# Patient Record
Sex: FEMALE | Race: WHITE | NOT HISPANIC OR LATINO | Employment: FULL TIME | URBAN - METROPOLITAN AREA
[De-identification: names, ages, dates, MRNs, and addresses within clinical notes are randomized per-mention and may not be internally consistent; named-entity substitution may affect disease eponyms.]

---

## 2017-01-04 ENCOUNTER — HOSPITAL ENCOUNTER (OUTPATIENT)
Facility: MEDICAL CENTER | Age: 37
End: 2017-01-04
Attending: FAMILY MEDICINE
Payer: COMMERCIAL

## 2017-01-04 PROCEDURE — 87653 STREP B DNA AMP PROBE: CPT

## 2017-01-05 LAB — GP B STREP DNA SPEC QL NAA+PROBE: NEGATIVE

## 2017-07-26 ENCOUNTER — HOSPITAL ENCOUNTER (OUTPATIENT)
Facility: MEDICAL CENTER | Age: 37
End: 2017-07-26
Attending: OBSTETRICS & GYNECOLOGY
Payer: COMMERCIAL

## 2017-07-26 ENCOUNTER — HOSPITAL ENCOUNTER (OUTPATIENT)
Dept: LAB | Facility: MEDICAL CENTER | Age: 37
End: 2017-07-26
Attending: OBSTETRICS & GYNECOLOGY
Payer: COMMERCIAL

## 2017-07-26 PROCEDURE — 88175 CYTOPATH C/V AUTO FLUID REDO: CPT

## 2017-07-27 LAB — CYTOLOGY REG CYTOL: NORMAL

## 2018-02-21 ENCOUNTER — OFFICE VISIT (OUTPATIENT)
Dept: URGENT CARE | Facility: PHYSICIAN GROUP | Age: 38
End: 2018-02-21
Payer: COMMERCIAL

## 2018-02-21 VITALS
HEART RATE: 94 BPM | HEIGHT: 65 IN | RESPIRATION RATE: 14 BRPM | WEIGHT: 220 LBS | DIASTOLIC BLOOD PRESSURE: 77 MMHG | SYSTOLIC BLOOD PRESSURE: 120 MMHG | TEMPERATURE: 97.8 F | BODY MASS INDEX: 36.65 KG/M2 | OXYGEN SATURATION: 96 %

## 2018-02-21 DIAGNOSIS — J01.00 ACUTE NON-RECURRENT MAXILLARY SINUSITIS: ICD-10-CM

## 2018-02-21 PROCEDURE — 99203 OFFICE O/P NEW LOW 30 MIN: CPT | Performed by: PHYSICIAN ASSISTANT

## 2018-02-21 RX ORDER — AMOXICILLIN AND CLAVULANATE POTASSIUM 875; 125 MG/1; MG/1
1 TABLET, FILM COATED ORAL 2 TIMES DAILY
Qty: 14 TAB | Refills: 0 | Status: SHIPPED | OUTPATIENT
Start: 2018-02-21 | End: 2018-02-28

## 2018-02-21 ASSESSMENT — ENCOUNTER SYMPTOMS
MUSCULOSKELETAL NEGATIVE: 1
SORE THROAT: 0
DIZZINESS: 0
NAUSEA: 0
DIARRHEA: 0
SINUS PAIN: 1
COUGH: 1
SHORTNESS OF BREATH: 0
ABDOMINAL PAIN: 0
SWOLLEN GLANDS: 0
CHILLS: 0
VOMITING: 0
FEVER: 0
SINUS PRESSURE: 1
SPUTUM PRODUCTION: 0
HEADACHES: 0

## 2018-02-21 NOTE — PROGRESS NOTES
"Subjective:      Adrianne Yanez is a 37 y.o. female who presents with Sinus Problem (sinus preasure x2 weeks)            Sinus Problem   This is a new problem. The current episode started 1 to 4 weeks ago (2 weeks). The problem is unchanged. There has been no fever. Her pain is at a severity of 2/10. The pain is mild. Associated symptoms include congestion, coughing, sinus pressure and sneezing. Pertinent negatives include no chills, ear pain, headaches, shortness of breath, sore throat or swollen glands. Past treatments include nothing.       Review of Systems   Constitutional: Negative for chills and fever.   HENT: Positive for congestion, sinus pain, sinus pressure and sneezing. Negative for ear pain and sore throat.    Respiratory: Positive for cough. Negative for sputum production and shortness of breath.    Cardiovascular: Negative for chest pain.   Gastrointestinal: Negative for abdominal pain, diarrhea, nausea and vomiting.   Genitourinary: Negative.    Musculoskeletal: Negative.    Skin: Negative for rash.   Neurological: Negative for dizziness and headaches.          Objective:     /77   Pulse 94   Temp 36.6 °C (97.8 °F)   Resp 14   Ht 1.651 m (5' 5\")   Wt 99.8 kg (220 lb)   SpO2 96%   BMI 36.61 kg/m²      Physical Exam   Constitutional: She is oriented to person, place, and time. She appears well-developed and well-nourished. No distress.   HENT:   Head: Normocephalic and atraumatic.       Right Ear: Hearing, tympanic membrane, external ear and ear canal normal.   Left Ear: Hearing, tympanic membrane, external ear and ear canal normal.   Nose: Right sinus exhibits maxillary sinus tenderness. Right sinus exhibits no frontal sinus tenderness. Left sinus exhibits maxillary sinus tenderness. Left sinus exhibits no frontal sinus tenderness.   Mouth/Throat: Oropharynx is clear and moist. No oropharyngeal exudate, posterior oropharyngeal edema or posterior oropharyngeal erythema.   Eyes: " Conjunctivae are normal. Pupils are equal, round, and reactive to light. Right eye exhibits no discharge. Left eye exhibits no discharge.   Neck: Normal range of motion.   Cardiovascular: Normal rate, regular rhythm and normal heart sounds.    No murmur heard.  Pulmonary/Chest: Effort normal and breath sounds normal. No respiratory distress. She has no wheezes. She has no rales.   Musculoskeletal: Normal range of motion.   Lymphadenopathy:     She has no cervical adenopathy.   Neurological: She is alert and oriented to person, place, and time.   Skin: Skin is warm and dry. She is not diaphoretic.   Psychiatric: She has a normal mood and affect. Her behavior is normal.   Nursing note and vitals reviewed.              PMH:  has no past medical history on file.  MEDS:   Current Outpatient Prescriptions:   •  amoxicillin-clavulanate (AUGMENTIN) 875-125 MG Tab, Take 1 Tab by mouth 2 times a day for 7 days., Disp: 14 Tab, Rfl: 0  •  oxycodone-acetaminophen (PERCOCET) 5-325 MG Tab, Take 1 Tab by mouth every four hours as needed for Moderate Pain ((Pain Scale 4-6))., Disp: 20 Tab, Rfl: 0  •  ibuprofen (MOTRIN) 600 MG Tab, Take 1 Tab by mouth every 6 hours as needed (Cramping)., Disp: 30 Tab, Rfl: 3  •  sennosides-docusate sodium (SENOKOT-S) 8.6-50 MG tablet, Take 1 Tab by mouth 2 times a day., Disp: 60 Tab, Rfl: 1  •  Cholecalciferol (VITAMIN D3) 5000 UNITS Cap, Take 1 Cap by mouth every day., Disp: , Rfl:   •  prenatal plus vitamin (STUARTNATAL 1+1) 27-1 MG TABS tablet, Take 1 Tab by mouth every morning., Disp: 30 Tab, Rfl: 3  ALLERGIES: No Known Allergies  SURGHX:   Past Surgical History:   Procedure Laterality Date   • REPEAT C SECTION  2/4/2017    Procedure: REPEAT C SECTION;  Surgeon: Nadeen Martin M.D.;  Location: [facility] DEL[facility];  Service:    • REPEAT C SECTION  1/7/2015    Performed by Loni Guzman M.D. at LABOR AND DELIVERY   • PRIMARY C SECTION  6/12/2013    Performed by Aryan Westbrook M.D. at LAB[facility] DELIVERY     SOCHX:  reports that she has never smoked. She has never used smokeless tobacco. She reports that she does not drink alcohol or use drugs.  FH: family history is not on file.    Assessment/Plan:     1. Acute non-recurrent maxillary sinusitis  - amoxicillin-clavulanate (AUGMENTIN) 875-125 MG Tab; Take 1 Tab by mouth 2 times a day for 7 days.  Dispense: 14 Tab; Refill: 0   - Complete full course of antibiotics as prescribed     Discussed use of nedi-pot, humidifier, and Flonase nasal spray for symptomatic relief. Call or return to office if symptoms persist or worsen. The patient demonstrated a good understanding and agreed with the treatment plan.

## 2018-07-27 ENCOUNTER — HOSPITAL ENCOUNTER (OUTPATIENT)
Dept: LAB | Facility: MEDICAL CENTER | Age: 38
End: 2018-07-27
Attending: OBSTETRICS & GYNECOLOGY
Payer: COMMERCIAL

## 2018-07-27 PROCEDURE — 87624 HPV HI-RISK TYP POOLED RSLT: CPT

## 2018-07-27 PROCEDURE — 88175 CYTOPATH C/V AUTO FLUID REDO: CPT

## 2018-07-31 LAB
CYTOLOGY REG CYTOL: NORMAL
HPV HR 12 DNA CVX QL NAA+PROBE: NEGATIVE
HPV16 DNA SPEC QL NAA+PROBE: NEGATIVE
HPV18 DNA SPEC QL NAA+PROBE: NEGATIVE
SPECIMEN SOURCE: NORMAL

## 2018-10-04 ENCOUNTER — HOSPITAL ENCOUNTER (OUTPATIENT)
Dept: LAB | Facility: MEDICAL CENTER | Age: 38
End: 2018-10-04
Attending: OBSTETRICS & GYNECOLOGY
Payer: COMMERCIAL

## 2018-10-04 PROCEDURE — 87491 CHLMYD TRACH DNA AMP PROBE: CPT

## 2018-10-04 PROCEDURE — 87591 N.GONORRHOEAE DNA AMP PROB: CPT

## 2018-10-05 LAB
C TRACH DNA SPEC QL NAA+PROBE: NEGATIVE
N GONORRHOEA DNA SPEC QL NAA+PROBE: NEGATIVE
SPECIMEN SOURCE: NORMAL

## 2018-10-15 ENCOUNTER — HOSPITAL ENCOUNTER (OUTPATIENT)
Dept: LAB | Facility: MEDICAL CENTER | Age: 38
End: 2018-10-15
Attending: OBSTETRICS & GYNECOLOGY
Payer: COMMERCIAL

## 2018-10-15 LAB
ABO GROUP BLD: NORMAL
BASOPHILS # BLD AUTO: 0.5 % (ref 0–1.8)
BASOPHILS # BLD: 0.06 K/UL (ref 0–0.12)
BLD GP AB SCN SERPL QL: NORMAL
EOSINOPHIL # BLD AUTO: 0.39 K/UL (ref 0–0.51)
EOSINOPHIL NFR BLD: 3 % (ref 0–6.9)
ERYTHROCYTE [DISTWIDTH] IN BLOOD BY AUTOMATED COUNT: 42.6 FL (ref 35.9–50)
HCT VFR BLD AUTO: 40.3 % (ref 37–47)
HGB BLD-MCNC: 13 G/DL (ref 12–16)
IMM GRANULOCYTES # BLD AUTO: 0.14 K/UL (ref 0–0.11)
IMM GRANULOCYTES NFR BLD AUTO: 1.1 % (ref 0–0.9)
LYMPHOCYTES # BLD AUTO: 3.2 K/UL (ref 1–4.8)
LYMPHOCYTES NFR BLD: 24.2 % (ref 22–41)
MCH RBC QN AUTO: 26.4 PG (ref 27–33)
MCHC RBC AUTO-ENTMCNC: 32.3 G/DL (ref 33.6–35)
MCV RBC AUTO: 81.9 FL (ref 81.4–97.8)
MONOCYTES # BLD AUTO: 0.61 K/UL (ref 0–0.85)
MONOCYTES NFR BLD AUTO: 4.6 % (ref 0–13.4)
NEUTROPHILS # BLD AUTO: 8.8 K/UL (ref 2–7.15)
NEUTROPHILS NFR BLD: 66.6 % (ref 44–72)
NRBC # BLD AUTO: 0 K/UL
NRBC BLD-RTO: 0 /100 WBC
PLATELET # BLD AUTO: 245 K/UL (ref 164–446)
PMV BLD AUTO: 12.6 FL (ref 9–12.9)
RBC # BLD AUTO: 4.92 M/UL (ref 4.2–5.4)
RH BLD: NORMAL
WBC # BLD AUTO: 13.2 K/UL (ref 4.8–10.8)

## 2018-10-15 PROCEDURE — 86762 RUBELLA ANTIBODY: CPT

## 2018-10-15 PROCEDURE — 85025 COMPLETE CBC W/AUTO DIFF WBC: CPT

## 2018-10-15 PROCEDURE — 86803 HEPATITIS C AB TEST: CPT

## 2018-10-15 PROCEDURE — 87340 HEPATITIS B SURFACE AG IA: CPT

## 2018-10-15 PROCEDURE — 86900 BLOOD TYPING SEROLOGIC ABO: CPT

## 2018-10-15 PROCEDURE — 86901 BLOOD TYPING SEROLOGIC RH(D): CPT

## 2018-10-15 PROCEDURE — 86850 RBC ANTIBODY SCREEN: CPT

## 2018-10-15 PROCEDURE — 86780 TREPONEMA PALLIDUM: CPT

## 2018-10-15 PROCEDURE — 87389 HIV-1 AG W/HIV-1&-2 AB AG IA: CPT

## 2018-10-15 PROCEDURE — 87086 URINE CULTURE/COLONY COUNT: CPT

## 2018-10-15 PROCEDURE — 36415 COLL VENOUS BLD VENIPUNCTURE: CPT

## 2018-10-16 LAB
HBV SURFACE AG SER QL: NEGATIVE
HCV AB SER QL: NEGATIVE
HIV 1+2 AB+HIV1 P24 AG SERPL QL IA: NON REACTIVE
RUBV AB SER QL: 101.9 IU/ML
TREPONEMA PALLIDUM IGG+IGM AB [PRESENCE] IN SERUM OR PLASMA BY IMMUNOASSAY: NON REACTIVE

## 2018-10-18 LAB
BACTERIA UR CULT: NORMAL
SIGNIFICANT IND 70042: NORMAL
SITE SITE: NORMAL
SOURCE SOURCE: NORMAL

## 2018-10-19 ENCOUNTER — HOSPITAL ENCOUNTER (OUTPATIENT)
Dept: LAB | Facility: MEDICAL CENTER | Age: 38
End: 2018-10-19
Attending: OBSTETRICS & GYNECOLOGY
Payer: COMMERCIAL

## 2018-10-19 PROCEDURE — 82106 ALPHA-FETOPROTEIN AMNIOTIC: CPT

## 2018-11-05 LAB
TEST NAME 95000: NORMAL
UNCODED TEST RESULT 95040: NORMAL

## 2018-12-07 ENCOUNTER — HOSPITAL ENCOUNTER (OUTPATIENT)
Dept: LAB | Facility: MEDICAL CENTER | Age: 38
End: 2018-12-07
Attending: OBSTETRICS & GYNECOLOGY
Payer: COMMERCIAL

## 2018-12-11 LAB — AMBIGUOUS DTTM AMBI4: NORMAL

## 2018-12-21 ENCOUNTER — HOSPITAL ENCOUNTER (OUTPATIENT)
Dept: LAB | Facility: MEDICAL CENTER | Age: 38
End: 2018-12-21
Attending: OBSTETRICS & GYNECOLOGY
Payer: COMMERCIAL

## 2018-12-21 LAB
GLUCOSE 1H P 50 G GLC PO SERPL-MCNC: 124 MG/DL (ref 70–139)
HCT VFR BLD AUTO: 38.1 % (ref 37–47)
HGB BLD-MCNC: 12.2 G/DL (ref 12–16)
PLATELET # BLD AUTO: 210 K/UL (ref 164–446)
TREPONEMA PALLIDUM IGG+IGM AB [PRESENCE] IN SERUM OR PLASMA BY IMMUNOASSAY: NON REACTIVE

## 2018-12-21 PROCEDURE — 81265 STR MARKERS SPECIMEN ANAL: CPT

## 2018-12-21 PROCEDURE — 82950 GLUCOSE TEST: CPT

## 2018-12-21 PROCEDURE — 36415 COLL VENOUS BLD VENIPUNCTURE: CPT

## 2018-12-21 PROCEDURE — 85018 HEMOGLOBIN: CPT

## 2018-12-21 PROCEDURE — 85049 AUTOMATED PLATELET COUNT: CPT

## 2018-12-21 PROCEDURE — 369999 HCHG MISC LAB CHARGE

## 2018-12-21 PROCEDURE — 86780 TREPONEMA PALLIDUM: CPT

## 2018-12-21 PROCEDURE — 85014 HEMATOCRIT: CPT

## 2019-01-09 LAB
TEST NAME 95000: NORMAL
UNCODED TEST RESULT 95040: NORMAL

## 2019-02-15 ENCOUNTER — APPOINTMENT (OUTPATIENT)
Dept: RADIOLOGY | Facility: MEDICAL CENTER | Age: 39
DRG: 833 | End: 2019-02-15
Attending: OBSTETRICS & GYNECOLOGY
Payer: COMMERCIAL

## 2019-02-15 ENCOUNTER — HOSPITAL ENCOUNTER (INPATIENT)
Facility: MEDICAL CENTER | Age: 39
LOS: 8 days | DRG: 833 | End: 2019-02-23
Attending: OBSTETRICS & GYNECOLOGY | Admitting: OBSTETRICS & GYNECOLOGY
Payer: COMMERCIAL

## 2019-02-15 LAB
ABO GROUP BLD: NORMAL
ALBUMIN SERPL BCP-MCNC: 3.5 G/DL (ref 3.2–4.9)
ALBUMIN/GLOB SERPL: 1.3 G/DL
ALP SERPL-CCNC: 87 U/L (ref 30–99)
ALT SERPL-CCNC: 14 U/L (ref 2–50)
ANION GAP SERPL CALC-SCNC: 10 MMOL/L (ref 0–11.9)
APPEARANCE UR: ABNORMAL
APPEARANCE UR: CLEAR
APPEARANCE UR: CLEAR
APTT PPP: 29.4 SEC (ref 24.7–36)
APTT PPP: 29.7 SEC (ref 24.7–36)
AST SERPL-CCNC: 18 U/L (ref 12–45)
BASOPHILS # BLD AUTO: 0.3 % (ref 0–1.8)
BASOPHILS # BLD AUTO: 0.4 % (ref 0–1.8)
BASOPHILS # BLD: 0.04 K/UL (ref 0–0.12)
BASOPHILS # BLD: 0.05 K/UL (ref 0–0.12)
BILIRUB SERPL-MCNC: 1.4 MG/DL (ref 0.1–1.5)
BILIRUB UR QL STRIP.AUTO: NEGATIVE
BLD GP AB SCN SERPL QL: NORMAL
BUN SERPL-MCNC: 5 MG/DL (ref 8–22)
CALCIUM SERPL-MCNC: 8.8 MG/DL (ref 8.5–10.5)
CHLORIDE SERPL-SCNC: 107 MMOL/L (ref 96–112)
CO2 SERPL-SCNC: 18 MMOL/L (ref 20–33)
COLOR UR AUTO: ABNORMAL
COLOR UR AUTO: YELLOW
COLOR UR: YELLOW
CREAT SERPL-MCNC: 0.36 MG/DL (ref 0.5–1.4)
EOSINOPHIL # BLD AUTO: 0.3 K/UL (ref 0–0.51)
EOSINOPHIL # BLD AUTO: 0.31 K/UL (ref 0–0.51)
EOSINOPHIL NFR BLD: 2.2 % (ref 0–6.9)
EOSINOPHIL NFR BLD: 2.4 % (ref 0–6.9)
ERYTHROCYTE [DISTWIDTH] IN BLOOD BY AUTOMATED COUNT: 45.2 FL (ref 35.9–50)
ERYTHROCYTE [DISTWIDTH] IN BLOOD BY AUTOMATED COUNT: 45.7 FL (ref 35.9–50)
FIBRINOGEN PPP-MCNC: 579 MG/DL (ref 215–460)
FIBRINOGEN PPP-MCNC: 634 MG/DL (ref 215–460)
GLOBULIN SER CALC-MCNC: 2.8 G/DL (ref 1.9–3.5)
GLUCOSE SERPL-MCNC: 100 MG/DL (ref 65–99)
GLUCOSE UR QL STRIP.AUTO: NEGATIVE MG/DL
GLUCOSE UR QL STRIP.AUTO: NEGATIVE MG/DL
GLUCOSE UR STRIP.AUTO-MCNC: NEGATIVE MG/DL
HCT VFR BLD AUTO: 37.6 % (ref 37–47)
HCT VFR BLD AUTO: 40.2 % (ref 37–47)
HGB BLD-MCNC: 12.1 G/DL (ref 12–16)
HGB BLD-MCNC: 12.8 G/DL (ref 12–16)
IMM GRANULOCYTES # BLD AUTO: 0.14 K/UL (ref 0–0.11)
IMM GRANULOCYTES # BLD AUTO: 0.15 K/UL (ref 0–0.11)
IMM GRANULOCYTES NFR BLD AUTO: 1.1 % (ref 0–0.9)
IMM GRANULOCYTES NFR BLD AUTO: 1.1 % (ref 0–0.9)
INR PPP: 1.23 (ref 0.87–1.13)
INR PPP: 1.38 (ref 0.87–1.13)
KETONES UR QL STRIP.AUTO: 40 MG/DL
KETONES UR QL STRIP.AUTO: NEGATIVE MG/DL
KETONES UR STRIP.AUTO-MCNC: 15 MG/DL
LEUKOCYTE ESTERASE UR QL STRIP.AUTO: NEGATIVE
LYMPHOCYTES # BLD AUTO: 2.39 K/UL (ref 1–4.8)
LYMPHOCYTES # BLD AUTO: 2.44 K/UL (ref 1–4.8)
LYMPHOCYTES NFR BLD: 17.8 % (ref 22–41)
LYMPHOCYTES NFR BLD: 19 % (ref 22–41)
MCH RBC QN AUTO: 27.1 PG (ref 27–33)
MCH RBC QN AUTO: 27.3 PG (ref 27–33)
MCHC RBC AUTO-ENTMCNC: 31.8 G/DL (ref 33.6–35)
MCHC RBC AUTO-ENTMCNC: 32.2 G/DL (ref 33.6–35)
MCV RBC AUTO: 84.7 FL (ref 81.4–97.8)
MCV RBC AUTO: 85 FL (ref 81.4–97.8)
MICRO URNS: ABNORMAL
MONOCYTES # BLD AUTO: 0.51 K/UL (ref 0–0.85)
MONOCYTES # BLD AUTO: 0.66 K/UL (ref 0–0.85)
MONOCYTES NFR BLD AUTO: 3.8 % (ref 0–13.4)
MONOCYTES NFR BLD AUTO: 5.1 % (ref 0–13.4)
NEUTROPHILS # BLD AUTO: 10.01 K/UL (ref 2–7.15)
NEUTROPHILS # BLD AUTO: 9.26 K/UL (ref 2–7.15)
NEUTROPHILS NFR BLD: 72 % (ref 44–72)
NEUTROPHILS NFR BLD: 74.8 % (ref 44–72)
NITRITE UR QL STRIP.AUTO: NEGATIVE
NRBC # BLD AUTO: 0 K/UL
NRBC # BLD AUTO: 0 K/UL
NRBC BLD-RTO: 0 /100 WBC
NRBC BLD-RTO: 0 /100 WBC
PH UR STRIP.AUTO: 5 [PH]
PH UR STRIP.AUTO: 6 [PH]
PH UR STRIP.AUTO: 6.5 [PH]
PLATELET # BLD AUTO: 193 K/UL (ref 164–446)
PLATELET # BLD AUTO: 211 K/UL (ref 164–446)
PMV BLD AUTO: 11.7 FL (ref 9–12.9)
PMV BLD AUTO: 12 FL (ref 9–12.9)
POTASSIUM SERPL-SCNC: 3.7 MMOL/L (ref 3.6–5.5)
PROT SERPL-MCNC: 6.3 G/DL (ref 6–8.2)
PROT UR QL STRIP: 100 MG/DL
PROT UR QL STRIP: NEGATIVE MG/DL
PROT UR QL STRIP: NEGATIVE MG/DL
PROTHROMBIN TIME: 15.6 SEC (ref 12–14.6)
PROTHROMBIN TIME: 17.1 SEC (ref 12–14.6)
RBC # BLD AUTO: 4.44 M/UL (ref 4.2–5.4)
RBC # BLD AUTO: 4.73 M/UL (ref 4.2–5.4)
RBC UR QL AUTO: ABNORMAL
RBC UR QL AUTO: NEGATIVE
RBC UR QL AUTO: NEGATIVE
RH BLD: NORMAL
SODIUM SERPL-SCNC: 135 MMOL/L (ref 135–145)
SP GR UR STRIP.AUTO: 1.01
SP GR UR: 1.01
SP GR UR: 1.01
URATE SERPL-MCNC: 5.4 MG/DL (ref 1.9–8.2)
UROBILINOGEN UR STRIP.AUTO-MCNC: 0.2 MG/DL
WBC # BLD AUTO: 12.9 K/UL (ref 4.8–10.8)
WBC # BLD AUTO: 13.4 K/UL (ref 4.8–10.8)

## 2019-02-15 PROCEDURE — 80053 COMPREHEN METABOLIC PANEL: CPT

## 2019-02-15 PROCEDURE — 83735 ASSAY OF MAGNESIUM: CPT

## 2019-02-15 PROCEDURE — 85610 PROTHROMBIN TIME: CPT | Mod: 91

## 2019-02-15 PROCEDURE — 81003 URINALYSIS AUTO W/O SCOPE: CPT

## 2019-02-15 PROCEDURE — 36415 COLL VENOUS BLD VENIPUNCTURE: CPT

## 2019-02-15 PROCEDURE — 76815 OB US LIMITED FETUS(S): CPT

## 2019-02-15 PROCEDURE — 86901 BLOOD TYPING SEROLOGIC RH(D): CPT

## 2019-02-15 PROCEDURE — 86900 BLOOD TYPING SEROLOGIC ABO: CPT

## 2019-02-15 PROCEDURE — 81002 URINALYSIS NONAUTO W/O SCOPE: CPT | Mod: 91

## 2019-02-15 PROCEDURE — 84550 ASSAY OF BLOOD/URIC ACID: CPT

## 2019-02-15 PROCEDURE — 96372 THER/PROPH/DIAG INJ SC/IM: CPT

## 2019-02-15 PROCEDURE — 86850 RBC ANTIBODY SCREEN: CPT

## 2019-02-15 PROCEDURE — 59025 FETAL NON-STRESS TEST: CPT

## 2019-02-15 PROCEDURE — 85025 COMPLETE CBC W/AUTO DIFF WBC: CPT | Mod: 91

## 2019-02-15 PROCEDURE — 87150 DNA/RNA AMPLIFIED PROBE: CPT

## 2019-02-15 PROCEDURE — 700111 HCHG RX REV CODE 636 W/ 250 OVERRIDE (IP): Performed by: OBSTETRICS & GYNECOLOGY

## 2019-02-15 PROCEDURE — 770002 HCHG ROOM/CARE - OB PRIVATE (112)

## 2019-02-15 PROCEDURE — 700111 HCHG RX REV CODE 636 W/ 250 OVERRIDE (IP)

## 2019-02-15 PROCEDURE — 700105 HCHG RX REV CODE 258: Performed by: OBSTETRICS & GYNECOLOGY

## 2019-02-15 PROCEDURE — 85730 THROMBOPLASTIN TIME PARTIAL: CPT | Mod: 91

## 2019-02-15 PROCEDURE — 302135 SEQUENTIAL COMPRESSION MACHINE: Performed by: OBSTETRICS & GYNECOLOGY

## 2019-02-15 PROCEDURE — A9270 NON-COVERED ITEM OR SERVICE: HCPCS | Performed by: OBSTETRICS & GYNECOLOGY

## 2019-02-15 PROCEDURE — 85384 FIBRINOGEN ACTIVITY: CPT | Mod: 91

## 2019-02-15 PROCEDURE — 96365 THER/PROPH/DIAG IV INF INIT: CPT

## 2019-02-15 PROCEDURE — 87081 CULTURE SCREEN ONLY: CPT

## 2019-02-15 PROCEDURE — 302790 HCHG STAT ANTEPARTUM CARE, DAILY

## 2019-02-15 PROCEDURE — 700112 HCHG RX REV CODE 229: Performed by: OBSTETRICS & GYNECOLOGY

## 2019-02-15 RX ORDER — VITAMIN A ACETATE, BETA CAROTENE, ASCORBIC ACID, CHOLECALCIFEROL, .ALPHA.-TOCOPHEROL ACETATE, DL-, THIAMINE MONONITRATE, RIBOFLAVIN, NIACINAMIDE, PYRIDOXINE HYDROCHLORIDE, FOLIC ACID, CYANOCOBALAMIN, CALCIUM CARBONATE, FERROUS FUMARATE, ZINC OXIDE, CUPRIC OXIDE 3080; 12; 120; 400; 1; 1.84; 3; 20; 22; 920; 25; 200; 27; 10; 2 [IU]/1; UG/1; MG/1; [IU]/1; MG/1; MG/1; MG/1; MG/1; MG/1; [IU]/1; MG/1; MG/1; MG/1; MG/1; MG/1
1 TABLET, FILM COATED ORAL EVERY MORNING
Status: DISCONTINUED | OUTPATIENT
Start: 2019-02-16 | End: 2019-02-23 | Stop reason: HOSPADM

## 2019-02-15 RX ORDER — CALCIUM GLUCONATE 94 MG/ML
1 INJECTION, SOLUTION INTRAVENOUS ONCE
Status: ACTIVE | OUTPATIENT
Start: 2019-02-15 | End: 2019-02-16

## 2019-02-15 RX ORDER — MAGNESIUM SULFATE HEPTAHYDRATE 40 MG/ML
INJECTION, SOLUTION INTRAVENOUS
Status: COMPLETED
Start: 2019-02-15 | End: 2019-02-15

## 2019-02-15 RX ORDER — ASPIRIN 81 MG/1
81 TABLET, CHEWABLE ORAL DAILY
Status: DISCONTINUED | OUTPATIENT
Start: 2019-02-16 | End: 2019-02-23 | Stop reason: HOSPADM

## 2019-02-15 RX ORDER — CEFAZOLIN SODIUM 2 G/100ML
2 INJECTION, SOLUTION INTRAVENOUS EVERY 8 HOURS
Status: DISCONTINUED | OUTPATIENT
Start: 2019-02-15 | End: 2019-02-18

## 2019-02-15 RX ORDER — MAGNESIUM SULFATE HEPTAHYDRATE 40 MG/ML
3 INJECTION, SOLUTION INTRAVENOUS CONTINUOUS
Status: DISCONTINUED | OUTPATIENT
Start: 2019-02-15 | End: 2019-02-18

## 2019-02-15 RX ORDER — ONDANSETRON 2 MG/ML
INJECTION INTRAMUSCULAR; INTRAVENOUS
Status: COMPLETED
Start: 2019-02-15 | End: 2019-02-15

## 2019-02-15 RX ORDER — DOCUSATE SODIUM 100 MG/1
100 CAPSULE, LIQUID FILLED ORAL 2 TIMES DAILY
Status: DISCONTINUED | OUTPATIENT
Start: 2019-02-15 | End: 2019-02-23 | Stop reason: HOSPADM

## 2019-02-15 RX ORDER — SODIUM CHLORIDE, SODIUM LACTATE, POTASSIUM CHLORIDE, CALCIUM CHLORIDE 600; 310; 30; 20 MG/100ML; MG/100ML; MG/100ML; MG/100ML
INJECTION, SOLUTION INTRAVENOUS CONTINUOUS
Status: DISCONTINUED | OUTPATIENT
Start: 2019-02-15 | End: 2019-02-23 | Stop reason: HOSPADM

## 2019-02-15 RX ORDER — DEXTROSE, SODIUM CHLORIDE, SODIUM LACTATE, POTASSIUM CHLORIDE, AND CALCIUM CHLORIDE 5; .6; .31; .03; .02 G/100ML; G/100ML; G/100ML; G/100ML; G/100ML
INJECTION, SOLUTION INTRAVENOUS CONTINUOUS
Status: DISCONTINUED | OUTPATIENT
Start: 2019-02-15 | End: 2019-02-23 | Stop reason: HOSPADM

## 2019-02-15 RX ORDER — ONDANSETRON 2 MG/ML
4 INJECTION INTRAMUSCULAR; INTRAVENOUS EVERY 4 HOURS PRN
Status: DISCONTINUED | OUTPATIENT
Start: 2019-02-15 | End: 2019-02-23 | Stop reason: HOSPADM

## 2019-02-15 RX ORDER — BETAMETHASONE SODIUM PHOSPHATE AND BETAMETHASONE ACETATE 3; 3 MG/ML; MG/ML
12 INJECTION, SUSPENSION INTRA-ARTICULAR; INTRALESIONAL; INTRAMUSCULAR; SOFT TISSUE EVERY 24 HOURS
Status: COMPLETED | OUTPATIENT
Start: 2019-02-15 | End: 2019-02-16

## 2019-02-15 RX ADMIN — ONDANSETRON 4 MG: 2 INJECTION INTRAMUSCULAR; INTRAVENOUS at 21:26

## 2019-02-15 RX ADMIN — SODIUM CHLORIDE, POTASSIUM CHLORIDE, SODIUM LACTATE AND CALCIUM CHLORIDE: 600; 310; 30; 20 INJECTION, SOLUTION INTRAVENOUS at 21:36

## 2019-02-15 RX ADMIN — SODIUM CHLORIDE, SODIUM LACTATE, POTASSIUM CHLORIDE, CALCIUM CHLORIDE AND DEXTROSE MONOHYDRATE: 5; 600; 310; 30; 20 INJECTION, SOLUTION INTRAVENOUS at 20:22

## 2019-02-15 RX ADMIN — BETAMETHASONE SODIUM PHOSPHATE AND BETAMETHASONE ACETATE 12 MG: 3; 3 INJECTION, SUSPENSION INTRA-ARTICULAR; INTRALESIONAL; INTRAMUSCULAR at 18:28

## 2019-02-15 RX ADMIN — MAGNESIUM SULFATE HEPTAHYDRATE 3 G/HR: 40 INJECTION, SOLUTION INTRAVENOUS at 21:27

## 2019-02-15 RX ADMIN — MAGNESIUM SULFATE HEPTAHYDRATE 2 G: 40 INJECTION, SOLUTION INTRAVENOUS at 21:14

## 2019-02-15 RX ADMIN — MAGNESIUM SULFATE HEPTAHYDRATE 4 G: 40 INJECTION, SOLUTION INTRAVENOUS at 20:58

## 2019-02-15 RX ADMIN — SODIUM CHLORIDE, POTASSIUM CHLORIDE, SODIUM LACTATE AND CALCIUM CHLORIDE: 600; 310; 30; 20 INJECTION, SOLUTION INTRAVENOUS at 12:18

## 2019-02-15 RX ADMIN — DOCUSATE SODIUM 100 MG: 100 CAPSULE, LIQUID FILLED ORAL at 20:21

## 2019-02-15 RX ADMIN — CEFAZOLIN SODIUM 2 G: 2 INJECTION, SOLUTION INTRAVENOUS at 21:37

## 2019-02-15 ASSESSMENT — PATIENT HEALTH QUESTIONNAIRE - PHQ9
1. LITTLE INTEREST OR PLEASURE IN DOING THINGS: NOT AT ALL
2. FEELING DOWN, DEPRESSED, IRRITABLE, OR HOPELESS: NOT AT ALL
2. FEELING DOWN, DEPRESSED, IRRITABLE, OR HOPELESS: NOT AT ALL
SUM OF ALL RESPONSES TO PHQ9 QUESTIONS 1 AND 2: 0
SUM OF ALL RESPONSES TO PHQ9 QUESTIONS 1 AND 2: 0
1. LITTLE INTEREST OR PLEASURE IN DOING THINGS: NOT AT ALL

## 2019-02-15 ASSESSMENT — LIFESTYLE VARIABLES
EVER_SMOKED: NEVER
ALCOHOL_USE: NO

## 2019-02-15 NOTE — PROGRESS NOTES
38 y.o.  EDC 19 EGA 33.4 here to LDA2 with FOB Mynor c/o bright red vaginal bleeding since 0830 this morning. Pt denies recent sexual intercourse. Denies contractions, reports positive fetal movement. Hx 3 previous C/S. Scheduled for a R C/S with BTL on 3/25/19. Pt has a Hx of HELLP syndrome with PTD, currently taking 81 mg ASA. Pt followed by Dr. Gallagher, Baby will need  Echo after delivery. FOB has Marfan Syndrome. 3 other boys have all been seen by Dr. Rojas.Pt lives in [place] near the [facility].  Prenatal records reviewed, no evidence of placental previa.     Clean catch urine specimen obtained. Bright red with tissue fragments floating in urine.   EFM & [place] applied. , moderate variability, accels present, no decels. [place] shows irregular UC's. Contractions palpate mild with soft resting tone. Report to Dr. Sifuentes, on call for Dr. Westbrook. Order for limited US to r/o abruption. CBC, PT, PTT, Fibrinogin, IV fluids. IV started in RAC after multiple attempts. Labs drawn and sent.     US here, baby transverse, LG 14.5 fundal placenta grade 1, umbilical cord noted by cervix.   Results reviewed with Dr. Sifuentes, order for SVE and cath UA.  1200 SVE closed/thick/high.   Cath UA clear yellow, moderate ketones, all other values negative.    Report to Dr. Sifuentes, no new orders. MD will come over after office hours to evaluate pt. If any additional gushes of blood, order Betamethasone.   1640 RN called to pt room, large gush of bright red vaginal bleeding saturating 1/3 to 1/2 of peripad with additional 4 cm [place] of bright red blood on bed alexis. Betamethasone ordered. Message left for Dr. Sifuentes.   1730 Dr. Sifuentes at bedside. Order to Admit pt for observation, repeat coags 6 hours after last draw, give Betamethasone.    1800 Report to Yordan AGUAYO to assume pt care.

## 2019-02-16 LAB
APTT PPP: 27.8 SEC (ref 24.7–36)
APTT PPP: 29.7 SEC (ref 24.7–36)
ERYTHROCYTE [DISTWIDTH] IN BLOOD BY AUTOMATED COUNT: 44.6 FL (ref 35.9–50)
FIBRINOGEN PPP-MCNC: 513 MG/DL (ref 215–460)
FIBRINOGEN PPP-MCNC: 537 MG/DL (ref 215–460)
GP B STREP DNA SPEC QL NAA+PROBE: NEGATIVE
HCT VFR BLD AUTO: 37.2 % (ref 37–47)
HGB BLD-MCNC: 12.2 G/DL (ref 12–16)
INR PPP: 1.3 (ref 0.87–1.13)
INR PPP: 1.34 (ref 0.87–1.13)
MAGNESIUM SERPL-MCNC: 4.9 MG/DL (ref 1.5–2.5)
MAGNESIUM SERPL-MCNC: 5.6 MG/DL (ref 1.5–2.5)
MAGNESIUM SERPL-MCNC: 5.7 MG/DL (ref 1.5–2.5)
MAGNESIUM SERPL-MCNC: 5.8 MG/DL (ref 1.5–2.5)
MCH RBC QN AUTO: 27.5 PG (ref 27–33)
MCHC RBC AUTO-ENTMCNC: 32.8 G/DL (ref 33.6–35)
MCV RBC AUTO: 84 FL (ref 81.4–97.8)
PLATELET # BLD AUTO: 197 K/UL (ref 164–446)
PMV BLD AUTO: 11.8 FL (ref 9–12.9)
PROTHROMBIN TIME: 16.3 SEC (ref 12–14.6)
PROTHROMBIN TIME: 16.7 SEC (ref 12–14.6)
RBC # BLD AUTO: 4.43 M/UL (ref 4.2–5.4)
WBC # BLD AUTO: 13 K/UL (ref 4.8–10.8)

## 2019-02-16 PROCEDURE — 36415 COLL VENOUS BLD VENIPUNCTURE: CPT

## 2019-02-16 PROCEDURE — 770002 HCHG ROOM/CARE - OB PRIVATE (112)

## 2019-02-16 PROCEDURE — A9270 NON-COVERED ITEM OR SERVICE: HCPCS | Performed by: OBSTETRICS & GYNECOLOGY

## 2019-02-16 PROCEDURE — 700102 HCHG RX REV CODE 250 W/ 637 OVERRIDE(OP): Performed by: OBSTETRICS & GYNECOLOGY

## 2019-02-16 PROCEDURE — 96372 THER/PROPH/DIAG INJ SC/IM: CPT

## 2019-02-16 PROCEDURE — 96365 THER/PROPH/DIAG IV INF INIT: CPT

## 2019-02-16 PROCEDURE — 700111 HCHG RX REV CODE 636 W/ 250 OVERRIDE (IP): Performed by: OBSTETRICS & GYNECOLOGY

## 2019-02-16 PROCEDURE — 700112 HCHG RX REV CODE 229: Performed by: OBSTETRICS & GYNECOLOGY

## 2019-02-16 PROCEDURE — 85384 FIBRINOGEN ACTIVITY: CPT

## 2019-02-16 PROCEDURE — 96366 THER/PROPH/DIAG IV INF ADDON: CPT

## 2019-02-16 PROCEDURE — 83735 ASSAY OF MAGNESIUM: CPT | Mod: 91

## 2019-02-16 PROCEDURE — 85730 THROMBOPLASTIN TIME PARTIAL: CPT

## 2019-02-16 PROCEDURE — 85027 COMPLETE CBC AUTOMATED: CPT

## 2019-02-16 PROCEDURE — 85610 PROTHROMBIN TIME: CPT

## 2019-02-16 PROCEDURE — 302790 HCHG STAT ANTEPARTUM CARE, DAILY

## 2019-02-16 RX ADMIN — Medication 1 TABLET: at 05:40

## 2019-02-16 RX ADMIN — BETAMETHASONE SODIUM PHOSPHATE AND BETAMETHASONE ACETATE 12 MG: 3; 3 INJECTION, SUSPENSION INTRA-ARTICULAR; INTRALESIONAL; INTRAMUSCULAR at 17:30

## 2019-02-16 RX ADMIN — VITAMIN D, TAB 1000IU (100/BT) 5000 UNITS: 25 TAB at 05:41

## 2019-02-16 RX ADMIN — CEFAZOLIN SODIUM 2 G: 2 INJECTION, SOLUTION INTRAVENOUS at 22:00

## 2019-02-16 RX ADMIN — DOCUSATE SODIUM 100 MG: 100 CAPSULE, LIQUID FILLED ORAL at 18:28

## 2019-02-16 RX ADMIN — CEFAZOLIN SODIUM 2 G: 2 INJECTION, SOLUTION INTRAVENOUS at 05:41

## 2019-02-16 RX ADMIN — CEFAZOLIN SODIUM 2 G: 2 INJECTION, SOLUTION INTRAVENOUS at 14:17

## 2019-02-16 RX ADMIN — ASPIRIN 81 MG 81 MG: 81 TABLET ORAL at 05:41

## 2019-02-16 RX ADMIN — DOCUSATE SODIUM 100 MG: 100 CAPSULE, LIQUID FILLED ORAL at 05:40

## 2019-02-16 RX ADMIN — MAGNESIUM SULFATE HEPTAHYDRATE 3 G/HR: 40 INJECTION, SOLUTION INTRAVENOUS at 22:05

## 2019-02-16 RX ADMIN — MAGNESIUM SULFATE HEPTAHYDRATE 3 G/HR: 40 INJECTION, SOLUTION INTRAVENOUS at 11:05

## 2019-02-16 NOTE — PROGRESS NOTES
"Labor Progress Note    Adrianne Mckenzie Yanez   33w4d      Subjective:  Pt is now feeling contractions, not painful. Pt got up to the bathroom and states had more bright red vaginal bleeding. Pt's chux has dry blood on it x 3 hrs.  Uterine contractions:yes  Pain: No    Objective:   Vitals:    02/15/19 1000 02/15/19 1808   BP:  116/70   Pulse:  80   Temp:  36.1 °C (96.9 °F)   TempSrc:  Temporal   Weight: 102.1 kg (225 lb)    Height: 1.651 m (5' 5\")      Fetal heart variability:140's category 1  [place]: q 2-5  Perineum: chux in place x 3 hrs with about 3 separate 1/2 dollar size dry spot of blood. Perineum is dry.      Meds:   Betamethasone 12 mg IM at about 7:20 pm    Labs:  Recent Results (from the past 24 hour(s))   POC UA    Collection Time: 02/15/19 11:03 AM   Result Value Ref Range    POC Color Red (A)     POC Appearance Turbid (A)     POC Glucose Negative Negative mg/dL    POC Ketones Negative Negative mg/dL    POC Specific Gravity 1.010 1.005 - 1.030    POC Blood Large (A) Negative    POC Urine PH 6.5 5.0 - 8.0    POC Protein 100 (A) Negative mg/dL    POC Nitrites Negative Negative    POC Leukocyte Esterase Negative Negative   CBC WITH DIFFERENTIAL    Collection Time: 02/15/19 12:05 PM   Result Value Ref Range    WBC 12.9 (H) 4.8 - 10.8 K/uL    RBC 4.73 4.20 - 5.40 M/uL    Hemoglobin 12.8 12.0 - 16.0 g/dL    Hematocrit 40.2 37.0 - 47.0 %    MCV 85.0 81.4 - 97.8 fL    MCH 27.1 27.0 - 33.0 pg    MCHC 31.8 (L) 33.6 - 35.0 g/dL    RDW 45.2 35.9 - 50.0 fL    Platelet Count 211 164 - 446 K/uL    MPV 12.0 9.0 - 12.9 fL    Neutrophils-Polys 72.00 44.00 - 72.00 %    Lymphocytes 19.00 (L) 22.00 - 41.00 %    Monocytes 5.10 0.00 - 13.40 %    Eosinophils 2.40 0.00 - 6.90 %    Basophils 0.40 0.00 - 1.80 %    Immature Granulocytes 1.10 (H) 0.00 - 0.90 %    Nucleated RBC 0.00 /100 WBC    Neutrophils (Absolute) 9.26 (H) 2.00 - 7.15 K/uL    Lymphs (Absolute) 2.44 1.00 - 4.80 K/uL    Monos (Absolute) 0.66 0.00 - 0.85 K/uL    Eos " (Absolute) 0.31 0.00 - 0.51 K/uL    Baso (Absolute) 0.05 0.00 - 0.12 K/uL    Immature Granulocytes (abs) 0.14 (H) 0.00 - 0.11 K/uL    NRBC (Absolute) 0.00 K/uL   Prothrombin Time    Collection Time: 02/15/19 12:05 PM   Result Value Ref Range    PT 15.6 (H) 12.0 - 14.6 sec    INR 1.23 (H) 0.87 - 1.13   APTT    Collection Time: 02/15/19 12:05 PM   Result Value Ref Range    APTT 29.4 24.7 - 36.0 sec   FIBRINOGEN    Collection Time: 02/15/19 12:05 PM   Result Value Ref Range    Fibrinogen 634 (H) 215 - 460 mg/dL   COD - Adult (Type and Screen)    Collection Time: 02/15/19 12:05 PM   Result Value Ref Range    ABO Grouping Only O     Rh Grouping Only POS     Antibody Screen-Cod NEG    POC UA    Collection Time: 02/15/19  6:09 PM   Result Value Ref Range    POC Color Yellow     POC Appearance Clear     POC Glucose Negative Negative mg/dL    POC Ketones 40 (A) Negative mg/dL    POC Specific Gravity 1.010 1.005 - 1.030    POC Blood Negative Negative    POC Urine PH 5.0 5.0 - 8.0    POC Protein Negative Negative mg/dL    POC Nitrites Negative Negative    POC Leukocyte Esterase Negative Negative   URINALYSIS    Collection Time: 02/15/19  6:20 PM   Result Value Ref Range    Color Yellow     Character Clear     Specific Gravity 1.013 <1.035    Ph 6.0 5.0 - 8.0    Glucose Negative Negative mg/dL    Ketones 15 (A) Negative mg/dL    Protein Negative Negative mg/dL    Bilirubin Negative Negative    Urobilinogen, Urine 0.2 Negative    Nitrite Negative Negative    Leukocyte Esterase Negative Negative    Occult Blood Negative Negative    Micro Urine Req see below    CBC WITH DIFFERENTIAL    Collection Time: 02/15/19  8:14 PM   Result Value Ref Range    WBC 13.4 (H) 4.8 - 10.8 K/uL    RBC 4.44 4.20 - 5.40 M/uL    Hemoglobin 12.1 12.0 - 16.0 g/dL    Hematocrit 37.6 37.0 - 47.0 %    MCV 84.7 81.4 - 97.8 fL    MCH 27.3 27.0 - 33.0 pg    MCHC 32.2 (L) 33.6 - 35.0 g/dL    RDW 45.7 35.9 - 50.0 fL    Platelet Count 193 164 - 446 K/uL    MPV  11.7 9.0 - 12.9 fL    Neutrophils-Polys 74.80 (H) 44.00 - 72.00 %    Lymphocytes 17.80 (L) 22.00 - 41.00 %    Monocytes 3.80 0.00 - 13.40 %    Eosinophils 2.20 0.00 - 6.90 %    Basophils 0.30 0.00 - 1.80 %    Immature Granulocytes 1.10 (H) 0.00 - 0.90 %    Nucleated RBC 0.00 /100 WBC    Neutrophils (Absolute) 10.01 (H) 2.00 - 7.15 K/uL    Lymphs (Absolute) 2.39 1.00 - 4.80 K/uL    Monos (Absolute) 0.51 0.00 - 0.85 K/uL    Eos (Absolute) 0.30 0.00 - 0.51 K/uL    Baso (Absolute) 0.04 0.00 - 0.12 K/uL    Immature Granulocytes (abs) 0.15 (H) 0.00 - 0.11 K/uL    NRBC (Absolute) 0.00 K/uL       Assessment:   33w4d/vaginal bleeding/ contractions- pt s/p betamethasone 12 mg IM x 1. Pt now with regular contractions. Start Magnesium sulfate per PTL protocol. Place shankar catheter and obtain GBBS culture. Start Ancef 2 gm IV q 8. Dr Gallagher has been consulted.  Repeat coags pending, platelets have dropped but it could be diluational. Will repeat labs in am unless pt starts having bright red bleeding.  Previous c/s x 3  Multiparous-desires sterilization  Fetal status: reassuring    Meli Sifuentes

## 2019-02-16 NOTE — H&P
DATE OF ADMISSION:  02/15/2019    CHIEF COMPLAINT:  Vaginal bleeding.    HISTORY OF PRESENT ILLNESS:  This is a 38-year-old  4, para 3 with a   due date of 2019 at 33 weeks and 4 days.  Patient has had prenatal care   with Dr. Westbrook in the first trimester with suspected cystic hygroma.  She   was seen by Dr. Gallagher.  Ultrasounds have been normal.  She was interested in the   NIPT testing, but it was lost and therefore no NIPT testing was done.    Patient has been on baby aspirin since she does have a history of HELLP   syndrome with her first baby with  delivery.  The patient has had 3   previous C-sections.  She has been doing well until today when she woke up to   go to the bathroom at 08:00 in the morning and she had bright red bleeding.    She wiped 3 times and the bright red bleeding continued.  Therefore, she came   to labor and delivery.  When she arrived to the labor and delivery, her pad   had brown blood, but no more bleeding.  At that time, I ordered labs, which   were essentially negative for H and H of 12.8 and 40.2, and platelets were   211.  She is O positive.  Antibody screen negative.  Her PT was 15.6, INR   1.23, APTT 29.4 and fibrinogen is 634.  A clean catch urine showed lot of   blood, so unable to see anything else abnormal.  Patient had an ultrasound and   the ultrasound showed that the fetus was in transverse position, fundal   posterior placenta, LG is 14.5, cervical length is normal at 4.34, normal   cardiac activity, no previa seen.  At this time, the patient has been doing   well; however, at 1630 hours she had another gush of bright red blood.  She   saturated the pad.  She now has a different pad on and the pad only shows   brown blood.  The patient did have a cervical exam before the bright red   bleeding at 1630 hours and at that time she was closed, thick and high.  The   patient is not renan.  She has not had any abdominal trauma.  She is not   having any  leaking of fluid.  Because the patient has had now a second bleed,   we are admitting her for observation.  We will give her betamethasone   steroids now for fetal lung maturity.  We will also consult Dr. Gallagher.  The   ultrasound that she had showed possibly the cord was presenting over the   cervix.    PAST MEDICAL HISTORY:  Previous HELLP syndrome with first pregnancy and 2   units of blood and Fresh Frozen Plasma.    PAST SURGICAL HISTORY:  Previous  sections x3 and also wisdom teeth   extraction in .  Her first  was in 2013, second  was   in 2015, and third  was in 2017.    MEDICATIONS:  She is on prenatal vitamins 1 p.o. daily, vitamin D 5000 one   p.o. daily and aspirin 81 mg 1 p.o. daily.    ALLERGIES:  No known drug allergies.    OBSTETRICAL HISTORY:  She is a  4, para 3, first pregnancy on   2013, primary  section secondary to HELLP syndrome diagnosed at   36 weeks and 1 day, platelets were [id number], she received platelets and FFP and   blood transfusion.  Second pregnancy, she delivered on 2015, repeat    section.  She had a spinal at 39 weeks, 9 pound 10 ounce infant.    Third pregnancy, delivered on 2017, repeat  section, spinal 38   weeks and 3 days, 7 pound infant.    GYNECOLOGIC HISTORY:  Patient started menstruating at age 16, has menstrual   cycles every 28-35 days, lasts about 5-7 days.  No history of STDs.  No   history of HSV.    SOCIAL HISTORY:  Patient is .  She denies tobacco, alcohol or drug use.    PHYSICAL EXAMINATION:  GENERAL:  She is afebrile.  Pleasant female in no acute distress.  VITAL SIGNS:  Blood pressure is 122/76.  ABDOMEN:  Soft, gravid.  EXTREMITIES:  No calf tenderness.  GENITOURINARY:  Per nurse, she is closed, thick and high.  There is a pad that   has brown blood, but no active bleeding at this time.  PELVIC:  Fetal heart tones are in the 140s, category 1.  TOCO,  none.    LABORATORY DATA:  Patient's labs again, cath urine is pending so this is the   first one that had a lot of blood in it.  H and H are 12.8 and 40.2, platelets   211 and her coags, PT 15.6, INR 1.23, APTT 29.4, and fibrinogen 634.  She is   O positive, antibody screen negative.  The ultrasound as above.  NIPT is not   done.  Hepatitis B surface antigen negative.  Hepatitis C is negative.  HIV is   negative.  She is O positive, antibody screen negative, rubella she is   immune, RPR nonreactive.  Chlamydia and gonorrhea are negative.    ASSESSMENT AND PLAN:  A 38-year-old  4, para 3 at 33 weeks and 4 days   by dates and ultrasound.  1.  Vaginal bleeding.  At this time, the patient has had 2 episodes of vaginal   bleeding.  We will repeat coags 6 hours from her last dose.  We will admit   her.  We will start her on betamethasone 12 mg IM x1 now and a second one in   24 hours.  If the patient starts renan, we will start her on magnesium   sulfate and we will have a low threshold to repeat  section.  I have   discussed with the patient and her  the plan of admission and steroids.    Patient will stay n.p.o. for now.  We will consult Dr. Gallagher.  2.  Previous  sections x3.  Patient is scheduled for a repeat at 39   weeks, but if she continues bleeding or if there is any evidence of abruption   with worsening bleeding or abnormal coags then we will proceed with delivery   before 39 weeks.  3.  Multiparous, desires permanent sterilization.  Patient has discussed this   with Dr. Westbrook and if we are looking at imminent delivery I will go over   with her again, but it sounds like the patient and her  have discussed   this and they want permanent sterilization and therefore, we will proceed with   bilateral salpingectomy at the time of  section.  4.  History of HELLP syndrome.  At this time, the patient has no   pregnancy-induced hypertension symptoms and her blood  pressures are normal,   but we will continue to monitor.  5.  Advanced maternal age.  6.  Possible cystic hygroma in the first trimester that resolved and per Dr. Gallagher the baby would need a  ultrasound for a  echo.  7.  Fetal status is overall reassuring.       ____________________________________     MD RAFAELA COFFEY / MICHAEL    DD:  02/15/2019 17:45:18  DT:  02/15/2019 18:27:06    D#:  [id number]  Job#:  [id number]    cc: ALVIN CARRANZA MD, WERO GALLAGHER MD

## 2019-02-16 NOTE — PROGRESS NOTES
0715 Report received from Jon, pt plan of care discussed pt care assumed. 0800 Initil assessment done see systems review.   0830 Dr Sifuentes in to see pt. Plan of care discussed.   I

## 2019-02-16 NOTE — PROGRESS NOTES
Labor Progress Note    Adrianne Mckenzie Yanez   33w5d      Subjective:  Pt tolerating magnesium sulfate. Pt without active bleeding. Pt with one pad all night with a small smear of brown blood. Pt is hungry.   Uterine contractions:no  Pain: No    Objective:   Vitals:    02/16/19 0606 02/16/19 0609 02/16/19 0610 02/16/19 0614   BP:       Pulse: 87 94 86 87   Resp:       Temp:       TempSrc:       SpO2:  92%  93%   Weight:       Height:         Fetal heart variability: 140's category 1  [place]: irritability  Nicole in place    Membranes ruptured: .no    Meds:   Magnesium sulfate  Betamethasone s/p 1st dose  Ancef 2 gm IV q 8  Labs:  Recent Results (from the past 24 hour(s))   POC UA    Collection Time: 02/15/19 11:03 AM   Result Value Ref Range    POC Color Red (A)     POC Appearance Turbid (A)     POC Glucose Negative Negative mg/dL    POC Ketones Negative Negative mg/dL    POC Specific Gravity 1.010 1.005 - 1.030    POC Blood Large (A) Negative    POC Urine PH 6.5 5.0 - 8.0    POC Protein 100 (A) Negative mg/dL    POC Nitrites Negative Negative    POC Leukocyte Esterase Negative Negative   CBC WITH DIFFERENTIAL    Collection Time: 02/15/19 12:05 PM   Result Value Ref Range    WBC 12.9 (H) 4.8 - 10.8 K/uL    RBC 4.73 4.20 - 5.40 M/uL    Hemoglobin 12.8 12.0 - 16.0 g/dL    Hematocrit 40.2 37.0 - 47.0 %    MCV 85.0 81.4 - 97.8 fL    MCH 27.1 27.0 - 33.0 pg    MCHC 31.8 (L) 33.6 - 35.0 g/dL    RDW 45.2 35.9 - 50.0 fL    Platelet Count 211 164 - 446 K/uL    MPV 12.0 9.0 - 12.9 fL    Neutrophils-Polys 72.00 44.00 - 72.00 %    Lymphocytes 19.00 (L) 22.00 - 41.00 %    Monocytes 5.10 0.00 - 13.40 %    Eosinophils 2.40 0.00 - 6.90 %    Basophils 0.40 0.00 - 1.80 %    Immature Granulocytes 1.10 (H) 0.00 - 0.90 %    Nucleated RBC 0.00 /100 WBC    Neutrophils (Absolute) 9.26 (H) 2.00 - 7.15 K/uL    Lymphs (Absolute) 2.44 1.00 - 4.80 K/uL    Monos (Absolute) 0.66 0.00 - 0.85 K/uL    Eos (Absolute) 0.31 0.00 - 0.51 K/uL    Baso  (Absolute) 0.05 0.00 - 0.12 K/uL    Immature Granulocytes (abs) 0.14 (H) 0.00 - 0.11 K/uL    NRBC (Absolute) 0.00 K/uL   Prothrombin Time    Collection Time: 02/15/19 12:05 PM   Result Value Ref Range    PT 15.6 (H) 12.0 - 14.6 sec    INR 1.23 (H) 0.87 - 1.13   APTT    Collection Time: 02/15/19 12:05 PM   Result Value Ref Range    APTT 29.4 24.7 - 36.0 sec   FIBRINOGEN    Collection Time: 02/15/19 12:05 PM   Result Value Ref Range    Fibrinogen 634 (H) 215 - 460 mg/dL   COD - Adult (Type and Screen)    Collection Time: 02/15/19 12:05 PM   Result Value Ref Range    ABO Grouping Only O     Rh Grouping Only POS     Antibody Screen-Cod NEG    POC UA    Collection Time: 02/15/19  6:09 PM   Result Value Ref Range    POC Color Yellow     POC Appearance Clear     POC Glucose Negative Negative mg/dL    POC Ketones 40 (A) Negative mg/dL    POC Specific Gravity 1.010 1.005 - 1.030    POC Blood Negative Negative    POC Urine PH 5.0 5.0 - 8.0    POC Protein Negative Negative mg/dL    POC Nitrites Negative Negative    POC Leukocyte Esterase Negative Negative   URINALYSIS    Collection Time: 02/15/19  6:20 PM   Result Value Ref Range    Color Yellow     Character Clear     Specific Gravity 1.013 <1.035    Ph 6.0 5.0 - 8.0    Glucose Negative Negative mg/dL    Ketones 15 (A) Negative mg/dL    Protein Negative Negative mg/dL    Bilirubin Negative Negative    Urobilinogen, Urine 0.2 Negative    Nitrite Negative Negative    Leukocyte Esterase Negative Negative    Occult Blood Negative Negative    Micro Urine Req see below    APTT    Collection Time: 02/15/19  7:00 PM   Result Value Ref Range    APTT 29.7 24.7 - 36.0 sec   Prothrombin Time    Collection Time: 02/15/19  7:00 PM   Result Value Ref Range    PT 17.1 (H) 12.0 - 14.6 sec    INR 1.38 (H) 0.87 - 1.13   FIBRINOGEN    Collection Time: 02/15/19  7:00 PM   Result Value Ref Range    Fibrinogen 579 (H) 215 - 460 mg/dL   CBC WITH DIFFERENTIAL    Collection Time: 02/15/19  8:14 PM    Result Value Ref Range    WBC 13.4 (H) 4.8 - 10.8 K/uL    RBC 4.44 4.20 - 5.40 M/uL    Hemoglobin 12.1 12.0 - 16.0 g/dL    Hematocrit 37.6 37.0 - 47.0 %    MCV 84.7 81.4 - 97.8 fL    MCH 27.3 27.0 - 33.0 pg    MCHC 32.2 (L) 33.6 - 35.0 g/dL    RDW 45.7 35.9 - 50.0 fL    Platelet Count 193 164 - 446 K/uL    MPV 11.7 9.0 - 12.9 fL    Neutrophils-Polys 74.80 (H) 44.00 - 72.00 %    Lymphocytes 17.80 (L) 22.00 - 41.00 %    Monocytes 3.80 0.00 - 13.40 %    Eosinophils 2.20 0.00 - 6.90 %    Basophils 0.30 0.00 - 1.80 %    Immature Granulocytes 1.10 (H) 0.00 - 0.90 %    Nucleated RBC 0.00 /100 WBC    Neutrophils (Absolute) 10.01 (H) 2.00 - 7.15 K/uL    Lymphs (Absolute) 2.39 1.00 - 4.80 K/uL    Monos (Absolute) 0.51 0.00 - 0.85 K/uL    Eos (Absolute) 0.30 0.00 - 0.51 K/uL    Baso (Absolute) 0.04 0.00 - 0.12 K/uL    Immature Granulocytes (abs) 0.15 (H) 0.00 - 0.11 K/uL    NRBC (Absolute) 0.00 K/uL   URIC ACID    Collection Time: 02/15/19  9:07 PM   Result Value Ref Range    Uric Acid 5.4 1.9 - 8.2 mg/dL   Comp Metabolic Panel    Collection Time: 02/15/19  9:07 PM   Result Value Ref Range    Sodium 135 135 - 145 mmol/L    Potassium 3.7 3.6 - 5.5 mmol/L    Chloride 107 96 - 112 mmol/L    Co2 18 (L) 20 - 33 mmol/L    Anion Gap 10.0 0.0 - 11.9    Glucose 100 (H) 65 - 99 mg/dL    Bun 5 (L) 8 - 22 mg/dL    Creatinine 0.36 (L) 0.50 - 1.40 mg/dL    Calcium 8.8 8.5 - 10.5 mg/dL    AST(SGOT) 18 12 - 45 U/L    ALT(SGPT) 14 2 - 50 U/L    Alkaline Phosphatase 87 30 - 99 U/L    Total Bilirubin 1.4 0.1 - 1.5 mg/dL    Albumin 3.5 3.2 - 4.9 g/dL    Total Protein 6.3 6.0 - 8.2 g/dL    Globulin 2.8 1.9 - 3.5 g/dL    A-G Ratio 1.3 g/dL   ESTIMATED GFR    Collection Time: 02/15/19  9:07 PM   Result Value Ref Range    GFR If African American >60 >60 mL/min/1.73 m 2    GFR If Non African American >60 >60 mL/min/1.73 m 2   MAGNESIUM    Collection Time: 02/15/19 11:57 PM   Result Value Ref Range    Magnesium 4.9 (H) 1.5 - 2.5 mg/dL        Assessment:   33w5d/vaginal bleedig/contractions- pt stable without further vaginal bleeding and only uterine irritability with rare contractions. Pt on Magnesium sulfate and s/p 1st dose of betamethasone. Plan CPM. Awaiting Dr Gallagher consult.  Likely a chronic abruption.  Previous c/s x 3  Multiparous desires sterilization  Fetal status: reassuring  H/o HELLP syndrome    Meli Sifuentes

## 2019-02-16 NOTE — PROGRESS NOTES
1800: Report received from Dacia AGUAYO. POC dicussed, care assumed,Pt transferred to antepartum. EFM and toco applied, audible fetal movement. Vitals taken. PT denies leaking and contractions, pt states positive fetal movement, pt has no visible bleeding at this moment but stated bleeding when wiped after urination.  1813: Admission profile complete  1828: Betamethasone given  1900: PT, PTT and fibrinogen ordered, lab at bedside, report given to Maddi AGUAYO.

## 2019-02-16 NOTE — PROGRESS NOTES
1900: Received report from ALL Martinez RN. POC discussed.    1930: Denies UC's at this time. Reports VB that is unchanged. Reports +FM.    2030: RN asked pt if feeling UC's pt states she may have had one 20 minutes prior but isn't feel  Painful UC's. Palpated ABD, remains soft.    2040: Discussed UC's with MD and continued bleeding. Orders received to start Magnesium sulfate if more then 4 UC in an hour.    2044: Dr. Sifuentes at bedside.POC discussed. Md assessed blood on pad that had before start of shift. Scant amount of blood.    2048: Spoke to Dr. Aileen guillaume MD to see pt.    2352: Dr. Sifuentes updated. Notified of UC's 7-10 minutes pt reports they are not painful. Reports that she can feel tightening. Reviewed Coag related labs. Discussed FHT low baseline with moderate variability.    0000: No further bleeding on raeann pad noted.    0500: Raeann pad remains unchanged with scant amount of blood from 2045pm assessment..     0640: Dr. Sifuentes updated, tracing reviewed for UC's, discussed irritability UC have spaced out. Orders received for a diet. Pt may eat breakfast this AM.    0700: Report given to KRZYSZTOF Justice RN.

## 2019-02-17 LAB — MAGNESIUM SERPL-MCNC: 5.8 MG/DL (ref 1.5–2.5)

## 2019-02-17 PROCEDURE — 96366 THER/PROPH/DIAG IV INF ADDON: CPT

## 2019-02-17 PROCEDURE — 700105 HCHG RX REV CODE 258: Performed by: OBSTETRICS & GYNECOLOGY

## 2019-02-17 PROCEDURE — A9270 NON-COVERED ITEM OR SERVICE: HCPCS | Performed by: OBSTETRICS & GYNECOLOGY

## 2019-02-17 PROCEDURE — 302790 HCHG STAT ANTEPARTUM CARE, DAILY

## 2019-02-17 PROCEDURE — 36415 COLL VENOUS BLD VENIPUNCTURE: CPT

## 2019-02-17 PROCEDURE — 83735 ASSAY OF MAGNESIUM: CPT

## 2019-02-17 PROCEDURE — 700111 HCHG RX REV CODE 636 W/ 250 OVERRIDE (IP): Performed by: OBSTETRICS & GYNECOLOGY

## 2019-02-17 PROCEDURE — 700102 HCHG RX REV CODE 250 W/ 637 OVERRIDE(OP): Performed by: OBSTETRICS & GYNECOLOGY

## 2019-02-17 PROCEDURE — 700112 HCHG RX REV CODE 229: Performed by: OBSTETRICS & GYNECOLOGY

## 2019-02-17 PROCEDURE — 770002 HCHG ROOM/CARE - OB PRIVATE (112)

## 2019-02-17 RX ADMIN — MAGNESIUM SULFATE HEPTAHYDRATE 3 G/HR: 40 INJECTION, SOLUTION INTRAVENOUS at 00:30

## 2019-02-17 RX ADMIN — DOCUSATE SODIUM 100 MG: 100 CAPSULE, LIQUID FILLED ORAL at 06:23

## 2019-02-17 RX ADMIN — CEFAZOLIN SODIUM 2 G: 2 INJECTION, SOLUTION INTRAVENOUS at 22:08

## 2019-02-17 RX ADMIN — DOCUSATE SODIUM 100 MG: 100 CAPSULE, LIQUID FILLED ORAL at 17:47

## 2019-02-17 RX ADMIN — CEFAZOLIN SODIUM 2 G: 2 INJECTION, SOLUTION INTRAVENOUS at 06:19

## 2019-02-17 RX ADMIN — CEFAZOLIN SODIUM 2 G: 2 INJECTION, SOLUTION INTRAVENOUS at 14:15

## 2019-02-17 RX ADMIN — VITAMIN D, TAB 1000IU (100/BT) 5000 UNITS: 25 TAB at 06:23

## 2019-02-17 RX ADMIN — SODIUM CHLORIDE, POTASSIUM CHLORIDE, SODIUM LACTATE AND CALCIUM CHLORIDE: 600; 310; 30; 20 INJECTION, SOLUTION INTRAVENOUS at 22:08

## 2019-02-17 RX ADMIN — Medication 1 TABLET: at 06:00

## 2019-02-17 RX ADMIN — SODIUM CHLORIDE, POTASSIUM CHLORIDE, SODIUM LACTATE AND CALCIUM CHLORIDE: 600; 310; 30; 20 INJECTION, SOLUTION INTRAVENOUS at 12:01

## 2019-02-17 RX ADMIN — ASPIRIN 81 MG 81 MG: 81 TABLET ORAL at 06:22

## 2019-02-17 RX ADMIN — MAGNESIUM SULFATE HEPTAHYDRATE 3 G/HR: 40 INJECTION, SOLUTION INTRAVENOUS at 12:02

## 2019-02-17 ASSESSMENT — PATIENT HEALTH QUESTIONNAIRE - PHQ9
1. LITTLE INTEREST OR PLEASURE IN DOING THINGS: NOT AT ALL
SUM OF ALL RESPONSES TO PHQ9 QUESTIONS 1 AND 2: 0
2. FEELING DOWN, DEPRESSED, IRRITABLE, OR HOPELESS: NOT AT ALL

## 2019-02-17 NOTE — CARE PLAN
Problem: Bowel/Gastric:  Goal: Normal bowel function is maintained or improved  Outcome: NOT MET  Dr edmonds is writing for meds

## 2019-02-17 NOTE — CONSULTS
DATE OF SERVICE:  2019    REQUESTING PHYSICIAN:  Dr. Sifuentes.    HISTORY OF PRESENT ILLNESS:  This is a 38-year-old , EDC of 2019,   currently at 33 weeks and 5 days, who was admitted with episode of vaginal   bleeding and upon arrival was found to be renan.  She was treated by Dr. Sifuentes last night with IV mag sulfate for tocolysis and was monitored for   additional bleeding.  Patient eventually stabilized and has been tolerating   the magnesium sulfate well.  Her hematocrit today was 37.2%, platelet count of   197.  Coagulation studies are normal, fibrinogen is normal.  The patient is   relatively stable at this time.  Ultrasound done last night showed a funic   presentation.    PAST MEDICAL HISTORY:  She denies any major medical problems, asthma,   seizures, hypertension, cardiovascular, GI or  diseases.  Prenatal course is   significant for a cystic hygroma seen previously; however, has now resolved,   karyotype is normal, microarray was not done due to technical issues in the   laboratory.    PREVIOUS OPERATIONS:   section x3.    TRANSFUSIONS:  In  when she developed HELLP syndrome.    ALLERGIES:  None.    MEDICATIONS:  Aspirin 81 mg per day and now has been discontinued.    VENEREAL DISEASE HISTORY:  Negative.    PAST OBSTETRICAL HISTORY:  In 2013, 36-week male, 5 pounds 10 ounces,   complicated by HELLP syndrome.  In 2015, term male, 9 pound 10 ounce, repeat    section.  In 2017, 39-week male, 7 pounds 0 ounces.    PHYSICAL EXAMINATION:  GENERAL:  A well-developed, well-nourished female, alert and oriented x3, in   no acute distress.  VITAL SIGNS:  All vital signs are all within normal limits.  HEAD:  Normocephalic.  EYES:  No scleral icterus or subconjunctival pallor.  Pupils are equal and   reactive to light and accommodation.  Extraocular movements are symmetrical.  EARS, NOSE, AND THROAT:  Grossly within normal limits.  LUNGS:  Clear.  HEART:  Regular  rate and rhythm.  Normal S1 and S2.  No S3, S4 or murmurs.  ABDOMEN:  Soft, gravid uterus.  EXTREMITIES:  No edema or varicosities.  Fetal heart rate tracing is baseline   at approximately 140 beats per minute, moderate variability, accelerations are   present.  No decelerations.  Minimal uterine activity noted.    ASSESSMENT:  1.  Intrauterine pregnancy at 33 weeks and 5 days.  2.  Probable abruption of placenta.  3.  Uterine contractions secondary to #2.  4.  History of cystic hygroma, normal karyotype.    PLAN:  At the present time, I would just continue observation and after   steroids have been completed I may discontinue magnesium sulfate.  We   discussed that abruptions are very difficult to predict and could be   intermittent or may be a one-time event or can recur subsequently.  For now,   we would just observe if she successfully is able to be taken off the   magnesium sulfate.  We will just observe and watch her in the hospital for a   few days and only if deemed stable discharge will be feasible.    All questions answered to her satisfaction.      Time:   95 minutes     ____________________________________     MD JEN RAMIREZ / MICHAEL    DD:  02/16/2019 19:24:37  DT:  02/16/2019 21:14:32    D#:  [id number]  Job#:  [id number]

## 2019-02-17 NOTE — PROGRESS NOTES
Labor Progress Note    Adrianne Mckenzie Yanez   33w5d      Subjective:  Pt without vaginal bleeding.  Uterine contractions:no  Pain: No    Objective:   Vitals:    02/16/19 1239 02/16/19 1339 02/16/19 1439 02/16/19 1539   BP: 106/61  114/66 121/71   Pulse: 93 89 90 95   Resp: 18   18   Temp: 36.6 °C (97.8 °F)   36.4 °C (97.6 °F)   TempSrc: Temporal   Temporal   SpO2: 92% 93% 94% 95%   Weight:       Height:         Fetal heart variability: 140's category 1  [place]: none     Membranes ruptured: .no    Meds:   Betamethasone s/p 1 dose, 2nd dose tonight  Magnesium sulfate 3g/hr  Pnv, colace  Labs:  Recent Results (from the past 24 hour(s))   POC UA    Collection Time: 02/15/19  6:09 PM   Result Value Ref Range    POC Color Yellow     POC Appearance Clear     POC Glucose Negative Negative mg/dL    POC Ketones 40 (A) Negative mg/dL    POC Specific Gravity 1.010 1.005 - 1.030    POC Blood Negative Negative    POC Urine PH 5.0 5.0 - 8.0    POC Protein Negative Negative mg/dL    POC Nitrites Negative Negative    POC Leukocyte Esterase Negative Negative   URINALYSIS    Collection Time: 02/15/19  6:20 PM   Result Value Ref Range    Color Yellow     Character Clear     Specific Gravity 1.013 <1.035    Ph 6.0 5.0 - 8.0    Glucose Negative Negative mg/dL    Ketones 15 (A) Negative mg/dL    Protein Negative Negative mg/dL    Bilirubin Negative Negative    Urobilinogen, Urine 0.2 Negative    Nitrite Negative Negative    Leukocyte Esterase Negative Negative    Occult Blood Negative Negative    Micro Urine Req see below    APTT    Collection Time: 02/15/19  7:00 PM   Result Value Ref Range    APTT 29.7 24.7 - 36.0 sec   Prothrombin Time    Collection Time: 02/15/19  7:00 PM   Result Value Ref Range    PT 17.1 (H) 12.0 - 14.6 sec    INR 1.38 (H) 0.87 - 1.13   FIBRINOGEN    Collection Time: 02/15/19  7:00 PM   Result Value Ref Range    Fibrinogen 579 (H) 215 - 460 mg/dL   CBC WITH DIFFERENTIAL    Collection Time: 02/15/19  8:14 PM   Result  Value Ref Range    WBC 13.4 (H) 4.8 - 10.8 K/uL    RBC 4.44 4.20 - 5.40 M/uL    Hemoglobin 12.1 12.0 - 16.0 g/dL    Hematocrit 37.6 37.0 - 47.0 %    MCV 84.7 81.4 - 97.8 fL    MCH 27.3 27.0 - 33.0 pg    MCHC 32.2 (L) 33.6 - 35.0 g/dL    RDW 45.7 35.9 - 50.0 fL    Platelet Count 193 164 - 446 K/uL    MPV 11.7 9.0 - 12.9 fL    Neutrophils-Polys 74.80 (H) 44.00 - 72.00 %    Lymphocytes 17.80 (L) 22.00 - 41.00 %    Monocytes 3.80 0.00 - 13.40 %    Eosinophils 2.20 0.00 - 6.90 %    Basophils 0.30 0.00 - 1.80 %    Immature Granulocytes 1.10 (H) 0.00 - 0.90 %    Nucleated RBC 0.00 /100 WBC    Neutrophils (Absolute) 10.01 (H) 2.00 - 7.15 K/uL    Lymphs (Absolute) 2.39 1.00 - 4.80 K/uL    Monos (Absolute) 0.51 0.00 - 0.85 K/uL    Eos (Absolute) 0.30 0.00 - 0.51 K/uL    Baso (Absolute) 0.04 0.00 - 0.12 K/uL    Immature Granulocytes (abs) 0.15 (H) 0.00 - 0.11 K/uL    NRBC (Absolute) 0.00 K/uL   URIC ACID    Collection Time: 02/15/19  9:07 PM   Result Value Ref Range    Uric Acid 5.4 1.9 - 8.2 mg/dL   Comp Metabolic Panel    Collection Time: 02/15/19  9:07 PM   Result Value Ref Range    Sodium 135 135 - 145 mmol/L    Potassium 3.7 3.6 - 5.5 mmol/L    Chloride 107 96 - 112 mmol/L    Co2 18 (L) 20 - 33 mmol/L    Anion Gap 10.0 0.0 - 11.9    Glucose 100 (H) 65 - 99 mg/dL    Bun 5 (L) 8 - 22 mg/dL    Creatinine 0.36 (L) 0.50 - 1.40 mg/dL    Calcium 8.8 8.5 - 10.5 mg/dL    AST(SGOT) 18 12 - 45 U/L    ALT(SGPT) 14 2 - 50 U/L    Alkaline Phosphatase 87 30 - 99 U/L    Total Bilirubin 1.4 0.1 - 1.5 mg/dL    Albumin 3.5 3.2 - 4.9 g/dL    Total Protein 6.3 6.0 - 8.2 g/dL    Globulin 2.8 1.9 - 3.5 g/dL    A-G Ratio 1.3 g/dL   ESTIMATED GFR    Collection Time: 02/15/19  9:07 PM   Result Value Ref Range    GFR If African American >60 >60 mL/min/1.73 m 2    GFR If Non African American >60 >60 mL/min/1.73 m 2   MAGNESIUM    Collection Time: 02/15/19 11:57 PM   Result Value Ref Range    Magnesium 4.9 (H) 1.5 - 2.5 mg/dL   SERUM MAGNESIUM LEVELS      Collection Time: 02/16/19  6:05 AM   Result Value Ref Range    Magnesium 5.8 (HH) 1.5 - 2.5 mg/dL   FIBRINOGEN    Collection Time: 02/16/19  6:05 AM   Result Value Ref Range    Fibrinogen 537 (H) 215 - 460 mg/dL   APTT    Collection Time: 02/16/19  6:05 AM   Result Value Ref Range    APTT 29.7 24.7 - 36.0 sec   Prothrombin Time    Collection Time: 02/16/19  6:05 AM   Result Value Ref Range    PT 16.7 (H) 12.0 - 14.6 sec    INR 1.34 (H) 0.87 - 1.13   CBC WITHOUT DIFFERENTIAL    Collection Time: 02/16/19  8:58 AM   Result Value Ref Range    WBC 13.0 (H) 4.8 - 10.8 K/uL    RBC 4.43 4.20 - 5.40 M/uL    Hemoglobin 12.2 12.0 - 16.0 g/dL    Hematocrit 37.2 37.0 - 47.0 %    MCV 84.0 81.4 - 97.8 fL    MCH 27.5 27.0 - 33.0 pg    MCHC 32.8 (L) 33.6 - 35.0 g/dL    RDW 44.6 35.9 - 50.0 fL    Platelet Count 197 164 - 446 K/uL    MPV 11.8 9.0 - 12.9 fL   APTT    Collection Time: 02/16/19  8:58 AM   Result Value Ref Range    APTT 27.8 24.7 - 36.0 sec   Prothrombin Time    Collection Time: 02/16/19  8:58 AM   Result Value Ref Range    PT 16.3 (H) 12.0 - 14.6 sec    INR 1.30 (H) 0.87 - 1.13   FIBRINOGEN    Collection Time: 02/16/19  8:58 AM   Result Value Ref Range    Fibrinogen 513 (H) 215 - 460 mg/dL   SERUM MAGNESIUM LEVELS     Collection Time: 02/16/19 12:07 PM   Result Value Ref Range    Magnesium 5.7 (HH) 1.5 - 2.5 mg/dL     ........  Recent Labs      02/15/19   1205  02/15/19   2014  02/16/19   0858   WBC  12.9*  13.4*  13.0*   RBC  4.73  4.44  4.43   HEMOGLOBIN  12.8  12.1  12.2   HEMATOCRIT  40.2  37.6  37.2   MCV  85.0  84.7  84.0   MCH  27.1  27.3  27.5   RDW  45.2  45.7  44.6   PLATELETCT  211  193  197   MPV  12.0  11.7  11.8   NEUTSPOLYS  72.00  74.80*   --    LYMPHOCYTES  19.00*  17.80*   --    MONOCYTES  5.10  3.80   --    EOSINOPHILS  2.40  2.20   --    BASOPHILS  0.40  0.30   --      Results for JOSE PASCUAL (MRN [id number]) as of 2/16/2019 16:53   Ref. Range 2/15/2019 12:05 2/15/2019 19:00 2/16/2019 06:05  2/16/2019 08:58   PT Latest Ref Range: 12.0 - 14.6 sec 15.6 (H) 17.1 (H) 16.7 (H) 16.3 (H)   INR Latest Ref Range: 0.87 - 1.13  1.23 (H) 1.38 (H) 1.34 (H) 1.30 (H)   APTT Latest Ref Range: 24.7 - 36.0 sec 29.4 29.7 29.7 27.8   Fibrinogen Latest Ref Range: 215 - 460 mg/dL 634 (H) 579 (H) 537 (H) 513 (H)       Assessment:   33w5d/vaginal bleeding-Resolved bleeding and resolved contractions. Pt stable with stable coags and cbc. CPM. D/C Magnesium sulfate 48 hrs after steroids. Continue with ancef secondary to shankar in place. Coags stable. NICU will consult.   Fetal status: reassuring.  Previous c/s x 3  Multiparous: desires sterilization.    Meli Sifuentes

## 2019-02-17 NOTE — PROGRESS NOTES
Julio from Lab called with critical result of Magnesium at 5.6. Critical lab result read back to Julio.   This critical lab result is within parameters established by Dr. Westbrook for this patient

## 2019-02-17 NOTE — CARE PLAN
Problem: Risk for Deep Vein Thrombosis/Venous Thromboembolism  Goal: DVT/VTE Prevention Measures in Place  Outcome: PROGRESSING AS EXPECTED  SCDs in place and functioning.    Problem: Risk for Infection, Impaired Wound Healing  Goal: Remain free from signs and symptoms of infection  Outcome: PROGRESSING AS EXPECTED  Pt is afebrile.    Problem: Risk for injury  Goal: Patient and fetus will be free of preventable injury/complications  Outcome: PROGRESSING AS EXPECTED  EFM and TOCO in place to monitor fetal well being and uterine activity.    Problem: Pain  Goal: Alleviation of Pain or a reduction in pain to the patient's comfort goal  Outcome: PROGRESSING AS EXPECTED  Pt denies pain.

## 2019-02-17 NOTE — PROGRESS NOTES
Crispin from Lab called with critical result of Magnesium at 5.8. Critical lab result read back to Crispin.   This critical lab result is within parameters established by  for this patient

## 2019-02-17 NOTE — CARE PLAN
Problem: Knowledge Deficit  Goal: Knowledge of disease process/condition, treatment plan, diagnostic tests, and medications will improve  Outcome: PROGRESSING AS EXPECTED  POC was discussed with pt.

## 2019-02-17 NOTE — PROGRESS NOTES
Labor Progress Note    Adrianne Mckenzie Yanez   33w6d      Subjective:  Pt on magnesium sulfate and tolerating it. Pt had a dollar size clot of blood when she got up to the commode to attempt to have a bm. Pt without any further vaginal bleeding. Pt without contractions. Pt without a bm since 2/15/19. Pt with good fetal mvt.  Uterine contractions:no  Pain: No    Objective:   Vitals:    02/17/19 0706 02/17/19 0707 02/17/19 0710 02/17/19 0712   BP:    105/57   Pulse: 71 80 76 74   Resp:       Temp:    36.6 °C (97.8 °F)   TempSrc:    Temporal   SpO2:  97%  90%   Weight:       Height:         Fetal heart variability: 140's category 1  [place]; none  : perineum dry    Membranes ruptured: .no    Meds:   Magnesium sulfate 3 gm/hr  Betamethasone s/p 2nd dose yesterday at17:30  Ancef 2 gm IV q 8 hrs  Labs:  Recent Results (from the past 24 hour(s))   CBC WITHOUT DIFFERENTIAL    Collection Time: 02/16/19  8:58 AM   Result Value Ref Range    WBC 13.0 (H) 4.8 - 10.8 K/uL    RBC 4.43 4.20 - 5.40 M/uL    Hemoglobin 12.2 12.0 - 16.0 g/dL    Hematocrit 37.2 37.0 - 47.0 %    MCV 84.0 81.4 - 97.8 fL    MCH 27.5 27.0 - 33.0 pg    MCHC 32.8 (L) 33.6 - 35.0 g/dL    RDW 44.6 35.9 - 50.0 fL    Platelet Count 197 164 - 446 K/uL    MPV 11.8 9.0 - 12.9 fL   APTT    Collection Time: 02/16/19  8:58 AM   Result Value Ref Range    APTT 27.8 24.7 - 36.0 sec   Prothrombin Time    Collection Time: 02/16/19  8:58 AM   Result Value Ref Range    PT 16.3 (H) 12.0 - 14.6 sec    INR 1.30 (H) 0.87 - 1.13   FIBRINOGEN    Collection Time: 02/16/19  8:58 AM   Result Value Ref Range    Fibrinogen 513 (H) 215 - 460 mg/dL   SERUM MAGNESIUM LEVELS     Collection Time: 02/16/19 12:07 PM   Result Value Ref Range    Magnesium 5.7 (HH) 1.5 - 2.5 mg/dL   SERUM MAGNESIUM LEVELS     Collection Time: 02/16/19  6:07 PM   Result Value Ref Range    Magnesium 5.6 (HH) 1.5 - 2.5 mg/dL   SERUM MAGNESIUM LEVELS DAILY     Collection Time: 02/17/19  5:56 AM   Result Value Ref Range     Magnesium 5.8 () 1.5 - 2.5 mg/dL       Assessment:   33w6d/vaginal bleeding/ contractions- pt stable on magnesium sulfate. No further vaginal bleeding or contractions. Plan is d/c magnesium tomorrow am and observe pt.  Previous c/s x 3  Multiparous: desires sterilization  Fetal status: reassuring  Constipation: pt will consider Dulcolax suppository.         Meli Sifuentes

## 2019-02-17 NOTE — PROGRESS NOTES
0700 report received, assessment done, monitors adjusted. Pt is comfortable, has no complaints, POC was discussed and teaching was done re in case of noticing  SROM and further bleeding or contractions.. Report given to Dr Sifuentes re the cloth and scant bright red blood and No BM since 2/15.

## 2019-02-17 NOTE — PROGRESS NOTES
1900- Report received from KRZYSZTOF Justice RN. Pt resting in bed. POC discussed.     1930- Pt assessment. Reports +FM. Denies UCs, pain, VB, or LOF.    0635- Pt up to BSC for BM. Unsuccessful. Quarter-sized blood clot and scant dash blood noted upon wiping.    0700- Report given to JUSTIN Hamilton RN. POC discussed.

## 2019-02-18 LAB
APPEARANCE UR: CLEAR
COLOR UR AUTO: YELLOW
GLUCOSE UR QL STRIP.AUTO: NEGATIVE MG/DL
KETONES UR QL STRIP.AUTO: 40 MG/DL
LEUKOCYTE ESTERASE UR QL STRIP.AUTO: NEGATIVE
MAGNESIUM SERPL-MCNC: 5.9 MG/DL (ref 1.5–2.5)
NITRITE UR QL STRIP.AUTO: NEGATIVE
PH UR STRIP.AUTO: 5 [PH]
PROT UR QL STRIP: NEGATIVE MG/DL
RBC UR QL AUTO: ABNORMAL
SP GR UR: 1.01

## 2019-02-18 PROCEDURE — 770002 HCHG ROOM/CARE - OB PRIVATE (112)

## 2019-02-18 PROCEDURE — 302790 HCHG STAT ANTEPARTUM CARE, DAILY

## 2019-02-18 PROCEDURE — 700111 HCHG RX REV CODE 636 W/ 250 OVERRIDE (IP): Performed by: OBSTETRICS & GYNECOLOGY

## 2019-02-18 PROCEDURE — 59025 FETAL NON-STRESS TEST: CPT

## 2019-02-18 PROCEDURE — 96366 THER/PROPH/DIAG IV INF ADDON: CPT

## 2019-02-18 PROCEDURE — A9270 NON-COVERED ITEM OR SERVICE: HCPCS | Performed by: OBSTETRICS & GYNECOLOGY

## 2019-02-18 PROCEDURE — 83735 ASSAY OF MAGNESIUM: CPT

## 2019-02-18 PROCEDURE — 36415 COLL VENOUS BLD VENIPUNCTURE: CPT

## 2019-02-18 PROCEDURE — 700112 HCHG RX REV CODE 229: Performed by: OBSTETRICS & GYNECOLOGY

## 2019-02-18 PROCEDURE — 700102 HCHG RX REV CODE 250 W/ 637 OVERRIDE(OP): Performed by: OBSTETRICS & GYNECOLOGY

## 2019-02-18 RX ORDER — NYSTATIN 100000 [USP'U]/G
POWDER TOPICAL 2 TIMES DAILY
Status: DISCONTINUED | OUTPATIENT
Start: 2019-02-18 | End: 2019-02-23 | Stop reason: HOSPADM

## 2019-02-18 RX ORDER — BISACODYL 10 MG
10 SUPPOSITORY, RECTAL RECTAL
Status: DISCONTINUED | OUTPATIENT
Start: 2019-02-18 | End: 2019-02-23 | Stop reason: HOSPADM

## 2019-02-18 RX ADMIN — CEFAZOLIN SODIUM 2 G: 2 INJECTION, SOLUTION INTRAVENOUS at 06:00

## 2019-02-18 RX ADMIN — DOCUSATE SODIUM 100 MG: 100 CAPSULE, LIQUID FILLED ORAL at 17:47

## 2019-02-18 RX ADMIN — BISACODYL 10 MG: 10 SUPPOSITORY RECTAL at 12:52

## 2019-02-18 RX ADMIN — ASPIRIN 81 MG 81 MG: 81 TABLET ORAL at 06:07

## 2019-02-18 RX ADMIN — Medication 1 TABLET: at 06:07

## 2019-02-18 RX ADMIN — NYSTATIN: 100000 POWDER TOPICAL at 17:47

## 2019-02-18 RX ADMIN — NYSTATIN: 100000 POWDER TOPICAL at 09:25

## 2019-02-18 RX ADMIN — DOCUSATE SODIUM 100 MG: 100 CAPSULE, LIQUID FILLED ORAL at 06:07

## 2019-02-18 RX ADMIN — VITAMIN D, TAB 1000IU (100/BT) 5000 UNITS: 25 TAB at 06:07

## 2019-02-18 ASSESSMENT — PATIENT HEALTH QUESTIONNAIRE - PHQ9
SUM OF ALL RESPONSES TO PHQ9 QUESTIONS 1 AND 2: 0
1. LITTLE INTEREST OR PLEASURE IN DOING THINGS: NOT AT ALL
2. FEELING DOWN, DEPRESSED, IRRITABLE, OR HOPELESS: NOT AT ALL

## 2019-02-18 NOTE — CONSULTS
S: No complaints.  Passed a clot earlier today.  O: Afebrile       EFM:  Baseline 140 bpm, accelerations present, moderate variability                  Irregular contractions  A: IUP 33 6/7 weeks      Probable marginal abruption  P: D/C magnesium sulfate tomorrow       Monitor for 2-3 days after off magnesium sulfate.       Case discussed with Dr. Sifuentes  Time:  15 minutes

## 2019-02-18 NOTE — PROGRESS NOTES
0700 report received, assessment done. Pt is comfortable and denies any contractions or any bleeding or pain. Pt has not had a BM since 2/15, Dr Sifuentes in the room spoke to pt and POC was discussed.  1200 pt in good spirit, reactive strip.  1400 No change.  1900 No bleeding or contractions, reactive strip, pt is doing well with magnesium, not lethargic.report given to Milagro AGUAYO.

## 2019-02-18 NOTE — PROGRESS NOTES
"This is a 38 year old  at 33 6/7 weeks admitted after two episodes of bleeding. No PROM, fetal status has been reassuring, and contractions have stopped on magnesium.  There was suspected cystic hygroma, nl US.  Her 1st child was \"1 month early\" and stayed approximately 1 week in the NICU. He was delivered due to maternal HELLP syndrome. She is taking baby aspirin, received her 2nd dose of betamethasone yesterday at 17:30, magnesium sulfate, and Ancef. The plan is to discontinue the magnesium sulfate tomorrow.     We discussed the anticipated course for a 33 or 34 week infant, risk for oxygen dependence, NEC, IVH, and jaundice requiring phototherapy. For infants <35weeks feeds would be slowly advanced and he would need prolonged TPN and most likely a PICC line. Use of donor breast milk for infants <35 weeks also discussed. Mom plans to breastfeed but needed to supplement with formula with her other boys. Discharge criteria were discussed and her questions were answered. Total time spent, approximately 20 minutes. Thank you.  "

## 2019-02-18 NOTE — CARE PLAN
Problem: Risk for Deep Vein Thrombosis/Venous Thromboembolism  Goal: DVT/VTE Prevention Measures in Place  Outcome: PROGRESSING AS EXPECTED  SCDs in place and functioning.    Problem: Risk for injury  Goal: Patient and fetus will be free of preventable injury/complications  Outcome: PROGRESSING AS EXPECTED  EFM and TOCO in place to monitor fetal well being and uterine activity.    Problem: Pain  Goal: Alleviation of Pain or a reduction in pain to the patient's comfort goal  Outcome: PROGRESSING AS EXPECTED  Pt denies pain.

## 2019-02-18 NOTE — PROGRESS NOTES
Labor Progress Note    Adrianne Mckenzie Yanez   34w0d      Subjective:  Pt slept all night. Pt has not had a bm since 19. Pt is considering a suppository. Pt with dry blood per vaginal on chux but no active bleeding. Pt is not feeling contractions.   Uterine contractions:no  Pain: No    Objective:   Vitals:    19 1557 19 1932 19 0000 19 0400   BP:  114/56 (!) 98/53 111/59   Pulse: 83 83 74 71   Resp:     Temp:  36.2 °C (97.2 °F) 36.6 °C (97.8 °F) 36.2 °C (97.1 °F)   TempSrc:  Temporal Temporal Temporal   SpO2: 94%      Weight:       Height:         Fetal heart variability: 140's category 1  [place]: irritable to rare    Membranes ruptured: .no    Meds:   Magnesium sulfate  S/p 2nd betamethasone yesterday at 5:30 pm  Labs:  Recent Results (from the past 24 hour(s))   SERUM MAGNESIUM LEVELS EVERY 3 DAYS    Collection Time: 19  5:51 AM   Result Value Ref Range    Magnesium 5.9 (HH) 1.5 - 2.5 mg/dL       Assessment:   34w0d/possible marginal abruption/ contractions- Pt without active bleeding since admission.   Pt stable on magnesium sulfate. Pt has completed her betamethasone series. Plan is d/c magnesium sulfate, shankar and ancef. Will monitor for 2-3 days.  Previous c/s x 3  multiparous-desires sterilization          Meli Sifuentes

## 2019-02-18 NOTE — PROGRESS NOTES
Report received, pt is just off magnesium and no shankar. Education was done regarding standing and prevention of falls. And when to call if bleeding. Only brownish discharge at this time.  0800 pt up to BR with no problem. Denies any contractions leakage or bleeding. Reactive NST and Dr Sifuentes ordered NST Q shift and toco only with NST.  1255 pt has been up to bathroom X4 but no BM since 2/15. Dulcolax Supp was inserted.prune juice given.  1500 pt had one BM. Dr Gallagher in, spoke to pt re POC.  Pt is in good spirit. Denies any bleeding or contractions and positive fetal movements.  1735 pt is up to shower, had 3 BMs and feels much  Better.   1900 no change, denies bleeding , felt two UCs since magnesium was DCed. Report given to Chelsy Cordova RN

## 2019-02-18 NOTE — CARE PLAN
Problem: Risk for Infection, Impaired Wound Healing  Goal: Remain free from signs and symptoms of infection  Outcome: PROGRESSING AS EXPECTED  Pt is afebrile

## 2019-02-18 NOTE — CONSULTS
S: No complaints off magnesium sulfate.  O: Afebrile       EFM:  Baseline 130 bpm, accelerations present, moderate variability                  Irregular contractions  A: IUP 34 weeks      Probable marginal abruption, stable  P: Consider outpatient management if stable over the next few days.       Suggest ultrasound in 2 weeks.        MFM to sign off.  Time:  15 minutes

## 2019-02-18 NOTE — PROGRESS NOTES
1855- Report received from JUSTIN Hamilton RN. Pt resting in bed. POC discussed.     1900- Pt assessment. Reports +FM. Denies VB, LOF, UCs, pain.    2015- VB noted on disposable linen saver. Approximately 30 mL of dark brown blood observed, accumulated over previous 13 hours.    0600- Bed bath provided. Scant, dark brown VB noted on disposable linen saver. Quarter sized red area noted on left groin. Approximately 10 mL over 10 hour period. No change from previous assessment. Occasional UCs observed throughout shift. Pt described as mild.     0630- Dr. Sifuentes at bedside. Orders received.     0650- Update to Dr. Sifuentes regarding groin redness. Nystatin order received. IV magnesium and shankar DC'd per order.    0700- Report given to JUSTIN Hamilton RN. POC discussed.

## 2019-02-19 PROCEDURE — 770002 HCHG ROOM/CARE - OB PRIVATE (112)

## 2019-02-19 PROCEDURE — A9270 NON-COVERED ITEM OR SERVICE: HCPCS | Performed by: OBSTETRICS & GYNECOLOGY

## 2019-02-19 PROCEDURE — 700102 HCHG RX REV CODE 250 W/ 637 OVERRIDE(OP): Performed by: OBSTETRICS & GYNECOLOGY

## 2019-02-19 PROCEDURE — 700112 HCHG RX REV CODE 229: Performed by: OBSTETRICS & GYNECOLOGY

## 2019-02-19 PROCEDURE — 700105 HCHG RX REV CODE 258: Performed by: OBSTETRICS & GYNECOLOGY

## 2019-02-19 PROCEDURE — 59025 FETAL NON-STRESS TEST: CPT

## 2019-02-19 RX ORDER — NIFEDIPINE 10 MG/1
20 CAPSULE ORAL ONCE
Status: COMPLETED | OUTPATIENT
Start: 2019-02-19 | End: 2019-02-19

## 2019-02-19 RX ORDER — NIFEDIPINE 60 MG/1
60 TABLET, EXTENDED RELEASE ORAL
Status: DISCONTINUED | OUTPATIENT
Start: 2019-02-19 | End: 2019-02-21

## 2019-02-19 RX ORDER — SODIUM CHLORIDE, SODIUM LACTATE, POTASSIUM CHLORIDE, CALCIUM CHLORIDE 600; 310; 30; 20 MG/100ML; MG/100ML; MG/100ML; MG/100ML
500 INJECTION, SOLUTION INTRAVENOUS CONTINUOUS
Status: DISCONTINUED | OUTPATIENT
Start: 2019-02-19 | End: 2019-02-23 | Stop reason: HOSPADM

## 2019-02-19 RX ORDER — CALCIUM CARBONATE 500 MG/1
1000 TABLET, CHEWABLE ORAL 3 TIMES DAILY PRN
Status: DISCONTINUED | OUTPATIENT
Start: 2019-02-19 | End: 2019-02-23 | Stop reason: HOSPADM

## 2019-02-19 RX ADMIN — NIFEDIPINE 60 MG: 60 TABLET, FILM COATED, EXTENDED RELEASE ORAL at 09:45

## 2019-02-19 RX ADMIN — NYSTATIN: 100000 POWDER TOPICAL at 05:49

## 2019-02-19 RX ADMIN — ASPIRIN 81 MG 81 MG: 81 TABLET ORAL at 05:58

## 2019-02-19 RX ADMIN — NIFEDIPINE 20 MG: 10 CAPSULE ORAL at 04:30

## 2019-02-19 RX ADMIN — ANTACID TABLETS 1000 MG: 500 TABLET, CHEWABLE ORAL at 19:34

## 2019-02-19 RX ADMIN — DOCUSATE SODIUM 100 MG: 100 CAPSULE, LIQUID FILLED ORAL at 17:49

## 2019-02-19 RX ADMIN — Medication 1 TABLET: at 06:00

## 2019-02-19 RX ADMIN — NYSTATIN: 100000 POWDER TOPICAL at 17:50

## 2019-02-19 RX ADMIN — SODIUM CHLORIDE, POTASSIUM CHLORIDE, SODIUM LACTATE AND CALCIUM CHLORIDE 500 ML: 600; 310; 30; 20 INJECTION, SOLUTION INTRAVENOUS at 04:53

## 2019-02-19 RX ADMIN — DOCUSATE SODIUM 100 MG: 100 CAPSULE, LIQUID FILLED ORAL at 06:01

## 2019-02-19 RX ADMIN — SODIUM CHLORIDE, POTASSIUM CHLORIDE, SODIUM LACTATE AND CALCIUM CHLORIDE: 600; 310; 30; 20 INJECTION, SOLUTION INTRAVENOUS at 05:51

## 2019-02-19 RX ADMIN — VITAMIN D, TAB 1000IU (100/BT) 5000 UNITS: 25 TAB at 05:53

## 2019-02-19 ASSESSMENT — PATIENT HEALTH QUESTIONNAIRE - PHQ9
2. FEELING DOWN, DEPRESSED, IRRITABLE, OR HOPELESS: NOT AT ALL
1. LITTLE INTEREST OR PLEASURE IN DOING THINGS: NOT AT ALL
SUM OF ALL RESPONSES TO PHQ9 QUESTIONS 1 AND 2: 0

## 2019-02-19 NOTE — CARE PLAN
Problem: Pain  Goal: Alleviation of Pain or a reduction in pain to the patient's comfort goal  Outcome: PROGRESSING SLOWER THAN EXPECTED  Pt denies pain or other problems @ this time.

## 2019-02-19 NOTE — PROGRESS NOTES
Pt is having contractions Q 4-6 min, after using the restroom, she feels very mild UCs by 1000. Dr Martin was called report given re UCs. Pt is comfortable enough, no bleeding, monitoring was discontinued till pt feels more frequent and stronger UCs.  1004 monitoring was discontinued.   1530 pt feels more UCs that are not strong but she  Feels them. External monitors applied at 1524. From 1524 to 1624 pt felt two UCs and thereissome irritability. Pt feels less contractions now.MonitoringwasDCes and there  Is no bleeding or leakage of fluid.  1750 the rash in the groin area is looking much better and almost gone.  1900 report given to night shift RN.

## 2019-02-19 NOTE — CARE PLAN
Problem: Infection  Goal: Will remain free from infection  Outcome: PROGRESSING AS EXPECTED  Pt afebrile, no s/s of infection at this time.

## 2019-02-19 NOTE — PROGRESS NOTES
Report recived and plan of care discussed.  2000--Pt states she feels like she's renan.  Pt on the monitor.  [place] showing irritability and a few irregular UC's 30-50 seconds in duration, palpating mildly.  Denies vaginal bleeding at this time.  0000--Pt up to bathroom and had a pencil eraser sized drop of pink red blood in toilet and a smear on toilet paper.    0100--Pt states she had same amount of blood on paper.  States she is feeling a few irregular UC's.  Dr. Westbrook called and patient status reported to him.  Pt reassured, encouraged to drink lots of water and keep her bladder empty.  0400--Pt states she is feeling UC's more panful and more frequent.  Pt on monitor and having UC's around q4 minutes.  Dr. Westbrook called and pt status reported to him.  Orders received.  500 ml of LR bolus given and Nifedipine 20mg given PO at this time.  0500--Pt has stopped having UC's at this time.  0600--Dr. Westbrook in to see patient.  SVE done by him and states her cervix is not of concern, SVE = closed, thick, high, posterior.  0700--Report to oncoming RN and plan of care discussed.

## 2019-02-19 NOTE — PROGRESS NOTES
0700 report received, pt is trying to rest, assessment don  0828 pt is awake monitors applied, many fetal movements, pt is having mils UCs Q 4-6 min. Reactive strip.

## 2019-02-19 NOTE — PROGRESS NOTES
34 1/7 weeks, HD #5    Afebrile , BP normal  FHR Cat I    Chart review:  Betamethasone completed 3 days ago.  MgSO4 DCd yesterday AM.  GBS sent 4 days ago, results not in Epic but we phoned lab and got verbal reassurance of negative GBS PCR test.    ROS:  Stable until the past several hours, began having painful ctx which seem to have responded to IV hydration 500 cc with nifedipine 20 mg (tolerated well). Having small smears of pink on her pad and toilet paper, no heavy or bright red bleeding.    PE:  Uterus nontender, calves nontender, Dhara neg, no edema, DTR 1+;  Cervix posterior, high, closed, thick, soft    DX:    34 1/7 wks   contractions  Suspect low-grade abruption  3 prior C-sections  Desires sterility  GBS negative per lab  Hx. Of HELLP previous pregnancy    PLAN:  Continue pregnancy.  Start nifedipine SR 60 mg/day at 10:00, 6 hrs after introductory dose.  ASA 81 mg/day to reduce risk of pre-eclampsia.  Betamethasone complete.

## 2019-02-20 PROCEDURE — 96372 THER/PROPH/DIAG INJ SC/IM: CPT

## 2019-02-20 PROCEDURE — 59025 FETAL NON-STRESS TEST: CPT

## 2019-02-20 PROCEDURE — 770002 HCHG ROOM/CARE - OB PRIVATE (112)

## 2019-02-20 PROCEDURE — A9270 NON-COVERED ITEM OR SERVICE: HCPCS | Performed by: OBSTETRICS & GYNECOLOGY

## 2019-02-20 PROCEDURE — 700102 HCHG RX REV CODE 250 W/ 637 OVERRIDE(OP): Performed by: OBSTETRICS & GYNECOLOGY

## 2019-02-20 PROCEDURE — 700111 HCHG RX REV CODE 636 W/ 250 OVERRIDE (IP): Performed by: OBSTETRICS & GYNECOLOGY

## 2019-02-20 PROCEDURE — 302790 HCHG STAT ANTEPARTUM CARE, DAILY

## 2019-02-20 PROCEDURE — 700112 HCHG RX REV CODE 229: Performed by: OBSTETRICS & GYNECOLOGY

## 2019-02-20 RX ORDER — TERBUTALINE SULFATE 1 MG/ML
0.25 INJECTION, SOLUTION SUBCUTANEOUS ONCE
Status: COMPLETED | OUTPATIENT
Start: 2019-02-20 | End: 2019-02-20

## 2019-02-20 RX ADMIN — TERBUTALINE SULFATE 0.25 MG: 1 INJECTION, SOLUTION SUBCUTANEOUS at 02:38

## 2019-02-20 RX ADMIN — Medication 1 TABLET: at 06:03

## 2019-02-20 RX ADMIN — ASPIRIN 81 MG 81 MG: 81 TABLET ORAL at 06:03

## 2019-02-20 RX ADMIN — NYSTATIN: 100000 POWDER TOPICAL at 06:00

## 2019-02-20 RX ADMIN — DOCUSATE SODIUM 100 MG: 100 CAPSULE, LIQUID FILLED ORAL at 06:03

## 2019-02-20 RX ADMIN — NYSTATIN: 100000 POWDER TOPICAL at 17:44

## 2019-02-20 RX ADMIN — NIFEDIPINE 60 MG: 60 TABLET, FILM COATED, EXTENDED RELEASE ORAL at 06:03

## 2019-02-20 RX ADMIN — DOCUSATE SODIUM 100 MG: 100 CAPSULE, LIQUID FILLED ORAL at 17:44

## 2019-02-20 RX ADMIN — VITAMIN D, TAB 1000IU (100/BT) 5000 UNITS: 25 TAB at 06:03

## 2019-02-20 ASSESSMENT — PATIENT HEALTH QUESTIONNAIRE - PHQ9
2. FEELING DOWN, DEPRESSED, IRRITABLE, OR HOPELESS: NOT AT ALL
1. LITTLE INTEREST OR PLEASURE IN DOING THINGS: NOT AT ALL
1. LITTLE INTEREST OR PLEASURE IN DOING THINGS: NOT AT ALL
2. FEELING DOWN, DEPRESSED, IRRITABLE, OR HOPELESS: NOT AT ALL
SUM OF ALL RESPONSES TO PHQ9 QUESTIONS 1 AND 2: 0
SUM OF ALL RESPONSES TO PHQ9 QUESTIONS 1 AND 2: 0

## 2019-02-20 NOTE — CARE PLAN
Problem: Communication  Goal: The ability to communicate needs accurately and effectively will improve  Outcome: PROGRESSING AS EXPECTED  Pt was asked to express her needs and feelings.

## 2019-02-20 NOTE — PROGRESS NOTES
1900- Report from ALFREDO Hamilton RN. To bedside, pt reports occasional ctx though less than earlier today. Reports less VB, now pink in color, +FM. POC discussed, questions answered. External monitors applied    Pt called out reporting small amount bright red blood noted on toilet paper after using restroom. Pt reports she may have felt some ctx but has been sleeping. TOCO applied and pt back to sleep     Pt called out again reporting small amount bright red blood again after using restroom. Dark brown blood also noted on chux pad. Pt reports feeling ctx q 10 minutes, unable to sleep. Call to Dr. Martin, order for dose terbutaline    0700- Report to CHIQUITA Dong RN

## 2019-02-20 NOTE — CARE PLAN
Problem: Infection  Goal: Will remain free from infection  Outcome: PROGRESSING AS EXPECTED  No s/s infection, membranes intact     Problem: Pain Management  Goal: Pain level will decrease to patient's comfort goal  Outcome: PROGRESSING AS EXPECTED  Ctx pain subsided after dose terbutaline, now sleeping

## 2019-02-20 NOTE — PROGRESS NOTES
34 2/7 weeks, HD #6     Afebrile , BP normal  FHR Cat I     Chart review:  Betamethasone completed 4 days ago.  MgSO4 DCd 2 days ago.  GBS negative 5 days ago.     ROS:  Had more red bleeding on her pad during the night, required SC terbutaline for ctx between 1 and 3 AM, tolerated nifedipine SR 60 mg well     PE:  Uterus nontender, calves nontender, Dhara neg, no edema, DTR 1+;  Cervix not examined today   DX:     34 2/7 wks   contractions  Suspect low-grade abruption  3 prior C-sections  Desires sterility  GBS negative per lab  Hx. Of HELLP previous pregnancy     PLAN:  Continue pregnancy.  Nifedipine SR 60 mg/day tocolysis.   ASA 81 mg/day to reduce risk of pre-eclampsia.  Betamethasone completed.  NST Q12H.

## 2019-02-20 NOTE — PROGRESS NOTES
Called by RN for pt C/O red blood on toilet paper.  Later she had some dark blood on Chux. She was renan Q 6 minutes and the contractions were keeping her awake.  She received one dose of Terb SQ.  Pt went back to sleep and has not reported any further contractions or bleeding. [place] shows occasional contractions now. Pt still sleeping.

## 2019-02-21 PROCEDURE — 59025 FETAL NON-STRESS TEST: CPT

## 2019-02-21 PROCEDURE — 700102 HCHG RX REV CODE 250 W/ 637 OVERRIDE(OP): Performed by: OBSTETRICS & GYNECOLOGY

## 2019-02-21 PROCEDURE — A9270 NON-COVERED ITEM OR SERVICE: HCPCS | Performed by: OBSTETRICS & GYNECOLOGY

## 2019-02-21 PROCEDURE — 770002 HCHG ROOM/CARE - OB PRIVATE (112)

## 2019-02-21 PROCEDURE — 700112 HCHG RX REV CODE 229: Performed by: OBSTETRICS & GYNECOLOGY

## 2019-02-21 PROCEDURE — 302790 HCHG STAT ANTEPARTUM CARE, DAILY

## 2019-02-21 RX ORDER — NIFEDIPINE 90 MG/1
90 TABLET, EXTENDED RELEASE ORAL
Status: DISCONTINUED | OUTPATIENT
Start: 2019-02-22 | End: 2019-02-22

## 2019-02-21 RX ORDER — NIFEDIPINE 30 MG/1
30 TABLET, EXTENDED RELEASE ORAL ONCE
Status: COMPLETED | OUTPATIENT
Start: 2019-02-21 | End: 2019-02-21

## 2019-02-21 RX ADMIN — NIFEDIPINE 60 MG: 60 TABLET, FILM COATED, EXTENDED RELEASE ORAL at 05:34

## 2019-02-21 RX ADMIN — Medication 1 TABLET: at 05:33

## 2019-02-21 RX ADMIN — ANTACID TABLETS 1000 MG: 500 TABLET, CHEWABLE ORAL at 00:17

## 2019-02-21 RX ADMIN — ASPIRIN 81 MG 81 MG: 81 TABLET ORAL at 05:33

## 2019-02-21 RX ADMIN — DOCUSATE SODIUM 100 MG: 100 CAPSULE, LIQUID FILLED ORAL at 05:33

## 2019-02-21 RX ADMIN — NIFEDIPINE 30 MG: 30 TABLET, FILM COATED, EXTENDED RELEASE ORAL at 10:00

## 2019-02-21 RX ADMIN — NYSTATIN: 100000 POWDER TOPICAL at 17:48

## 2019-02-21 RX ADMIN — NYSTATIN: 100000 POWDER TOPICAL at 05:34

## 2019-02-21 RX ADMIN — DOCUSATE SODIUM 100 MG: 100 CAPSULE, LIQUID FILLED ORAL at 17:47

## 2019-02-21 RX ADMIN — VITAMIN D, TAB 1000IU (100/BT) 5000 UNITS: 25 TAB at 05:33

## 2019-02-21 ASSESSMENT — PATIENT HEALTH QUESTIONNAIRE - PHQ9
1. LITTLE INTEREST OR PLEASURE IN DOING THINGS: NOT AT ALL
2. FEELING DOWN, DEPRESSED, IRRITABLE, OR HOPELESS: NOT AT ALL
SUM OF ALL RESPONSES TO PHQ9 QUESTIONS 1 AND 2: 0

## 2019-02-21 NOTE — CARE PLAN
Problem: Risk for injury  Goal: Fetus will be free of preventable trauma or other complications  Outcome: PROGRESSING AS EXPECTED  No signs of fetal compromise is present.

## 2019-02-21 NOTE — PROGRESS NOTES
34 3/7 weeks, HD #7     Afebrile , BP normal  FHR Cat I        ROS:  No red bleeding since yesterday AM!  There are more contractions in the middle of the night, tolerating nifedipine SR 60 mg well.    PE:  Uterus nontender, calves nontender, Dhara neg, no edema, DTR 1+;  Cervix not examined today   DX:     34 3/7 wks   contractions  Suspect low-grade abruption  3 prior C-sections  Desires sterility  GBS negative per lab  Hx. Of HELLP previous pregnancy     PLAN:  Continue pregnancy.  Increase Nifedipine SR to 90 mg/day,  tocolysis.   ASA 81 mg/day to reduce risk of pre-eclampsia.    NST Q12H.  Ultimately deliver by , bilateral salpingectomy.    Betamethasone completed 2019  MgSO4 DCd 2019  GBS negative 2/15/2019

## 2019-02-21 NOTE — PROGRESS NOTES
0700: report received from Sharon AGUAYO. POC dicussed, care assumed  0800: pt has no complaints of pain, leaking or bleeding, no c/o contractions at this time.   1420: report given to Marielena AGUAYO  1900: report given to Maddi AGUAYO

## 2019-02-21 NOTE — CARE PLAN
Problem: Knowledge Deficit  Goal: Patient/Support Person demonstrates understanding regarding condition, prognosis and treatment needs during the pregnancy  Outcome: PROGRESSING AS EXPECTED  POC for the day was discussed with pt.

## 2019-02-21 NOTE — PROGRESS NOTES
"1900: Received report from ALL Martinze RN. POC discussed.     1930: Pt denies UC's at this time and VB. Reports +FM.     0328: Report UC's same intensity as previous morning. No VB.    0424: Dr. Sifuentes notified that pt is having UC (x5 sine 0336) pt rates UC the same as yesterday. Notified MD of Prolonged Deceleration at 0420 FHT down to 110. Per MD keep pt on continuous EFM  And TOCO. RN to report any changes.    0630: Pt states some relief from UC's \" Still having some\" pt able to rest.      0635: Dr. Sifuentes, in department, tracing reviewed. Pt may be taken off Continuous EFM and TOCO.    0700: Report given to JUSTIN Hamilton RN.    "

## 2019-02-21 NOTE — PROGRESS NOTES
0700 report received, pt is sleeping.  0800 assessment done. Pt's IV is leaking, all connections were tightened and still leaking.. IV was DCed, DR Westbrook in the room, order received to increase Nifedipine to 90 mg per day and since pt received it already a 30 mg for today was ordered.  1000 Nifedipine 30 mg was give. Pt denies any bleeding or any contractions, feels comfortable. She showered and all beding was changed. Pt also took a walk around the hallways.  1400 no change, pt is comfortable, denies any bleeding or contractions.  1700 no change.  1900 report given to ALFREDO Archibald RN.

## 2019-02-22 PROCEDURE — 302790 HCHG STAT ANTEPARTUM CARE, DAILY

## 2019-02-22 PROCEDURE — 700102 HCHG RX REV CODE 250 W/ 637 OVERRIDE(OP): Performed by: OBSTETRICS & GYNECOLOGY

## 2019-02-22 PROCEDURE — 59025 FETAL NON-STRESS TEST: CPT

## 2019-02-22 PROCEDURE — 770002 HCHG ROOM/CARE - OB PRIVATE (112)

## 2019-02-22 PROCEDURE — 96372 THER/PROPH/DIAG INJ SC/IM: CPT

## 2019-02-22 PROCEDURE — A9270 NON-COVERED ITEM OR SERVICE: HCPCS | Performed by: OBSTETRICS & GYNECOLOGY

## 2019-02-22 PROCEDURE — 700111 HCHG RX REV CODE 636 W/ 250 OVERRIDE (IP)

## 2019-02-22 PROCEDURE — 700112 HCHG RX REV CODE 229: Performed by: OBSTETRICS & GYNECOLOGY

## 2019-02-22 RX ORDER — TERBUTALINE SULFATE 1 MG/ML
0.25 INJECTION, SOLUTION SUBCUTANEOUS ONCE
Status: COMPLETED | OUTPATIENT
Start: 2019-02-22 | End: 2019-02-22

## 2019-02-22 RX ORDER — TERBUTALINE SULFATE 1 MG/ML
INJECTION, SOLUTION SUBCUTANEOUS
Status: COMPLETED
Start: 2019-02-22 | End: 2019-02-22

## 2019-02-22 RX ORDER — NIFEDIPINE 90 MG/1
90 TABLET, EXTENDED RELEASE ORAL
Status: DISCONTINUED | OUTPATIENT
Start: 2019-02-22 | End: 2019-02-23

## 2019-02-22 RX ADMIN — Medication 1 TABLET: at 05:45

## 2019-02-22 RX ADMIN — ANTACID TABLETS 1000 MG: 500 TABLET, CHEWABLE ORAL at 00:01

## 2019-02-22 RX ADMIN — TERBUTALINE SULFATE 0.25 MG: 1 INJECTION, SOLUTION SUBCUTANEOUS at 02:54

## 2019-02-22 RX ADMIN — NYSTATIN: 100000 POWDER TOPICAL at 05:45

## 2019-02-22 RX ADMIN — NYSTATIN: 100000 POWDER TOPICAL at 19:02

## 2019-02-22 RX ADMIN — DOCUSATE SODIUM 100 MG: 100 CAPSULE, LIQUID FILLED ORAL at 18:24

## 2019-02-22 RX ADMIN — VITAMIN D, TAB 1000IU (100/BT) 5000 UNITS: 25 TAB at 05:44

## 2019-02-22 RX ADMIN — ANTACID TABLETS 1000 MG: 500 TABLET, CHEWABLE ORAL at 02:29

## 2019-02-22 RX ADMIN — DOCUSATE SODIUM 100 MG: 100 CAPSULE, LIQUID FILLED ORAL at 05:45

## 2019-02-22 RX ADMIN — NIFEDIPINE 90 MG: 90 TABLET, FILM COATED, EXTENDED RELEASE ORAL at 14:46

## 2019-02-22 RX ADMIN — ASPIRIN 81 MG 81 MG: 81 TABLET ORAL at 05:44

## 2019-02-22 ASSESSMENT — PATIENT HEALTH QUESTIONNAIRE - PHQ9
SUM OF ALL RESPONSES TO PHQ9 QUESTIONS 1 AND 2: 0
2. FEELING DOWN, DEPRESSED, IRRITABLE, OR HOPELESS: NOT AT ALL
1. LITTLE INTEREST OR PLEASURE IN DOING THINGS: NOT AT ALL

## 2019-02-22 NOTE — PROGRESS NOTES
34 4/7 weeks, HD #8     Afebrile , BP normal  FHR Cat I        ROS:  No red bleeding for 2 days!  Consistently c/o painful ctx in the middle of the night, will change timing of SR nifedipine to 15:00 so peak effect is middle of the night.     PE:  Uterus nontender, calves nontender, Dhara neg, no edema, DTR 1+;  Cervix not examined today   DX:     34 4/7 wks   contractions  Suspect low-grade abruption  3 prior C-sections  Desires sterility  GBS negative per lab  Hx. Of HELLP previous pregnancy     PLAN:  Continue pregnancy.   Nifedipine SR to 90 mg/day,  tocolysis, at 15:00.   ASA 81 mg/day to reduce risk of pre-eclampsia.    NST Q12H.  Ultimately deliver by , bilateral salpingectomy.     Betamethasone completed 2019  MgSO4 DCd 2019  GBS negative 2/15/2019

## 2019-02-22 NOTE — PROGRESS NOTES
[facility] - 19 EGA - 34.4    0700 - Report received from JUSTIN Archibald RN. POC discussed, care assumed.   0745 - Dr. Westbrook at bedside. Update given. Orders received.  0810 -  Full Assessment complete.  No complaints of contractions, ROM, or vaginal bleeding at this time. Pt reports good FM. POC discussed with pt and family members, all questions answered.   0938 - External monitors applied at this time for 1 hour NST. Category 1 FHT at this time.  1041 - NST obtained. Monitors removed.  1130 - pt ambulating in scanlon. No needs or complaints at this time.  1310 - pt ambulating in scanlon. Reports small plum size amount of bright red blood on toilet paper when wiping. Pt educated to stop ambulating and return to bed at this time  1445 - pt resting on couch. Reports that she had another episode of similar bleeding when up to bathroom. Denies ctx, LOF. Reports good FM. No other needs at this time  1745 - pt resting on couch. No needs or complaints. Reports that bleeding is now light pink when wiping. Dr. Martin updated. No new orders at this time.  1900 - Report given to SHARATH Felton RN. POC discussed

## 2019-02-22 NOTE — PROGRESS NOTES
1900: Received report from JUSTIN Hamilton RN. POC discussed.    0142: C/o painful UC's. EFM and toco placed. No VB at this time.    0230: Pt still c/o UC's that she states are worse.     0233: Left message for Dr. Xie.    0248: Dr. Xie notified of UC every 4-5 mins that pt states are more uncomfortable at this time. Order received to check pt cervix and give Terbutaline.    0250: SVE, Closed/ thick/ high    0300: Dr. Xie updated.    0700: Report given to TRINY Coto RN.

## 2019-02-23 VITALS
SYSTOLIC BLOOD PRESSURE: 118 MMHG | WEIGHT: 225 LBS | TEMPERATURE: 96.7 F | BODY MASS INDEX: 37.49 KG/M2 | DIASTOLIC BLOOD PRESSURE: 71 MMHG | OXYGEN SATURATION: 94 % | HEIGHT: 65 IN | RESPIRATION RATE: 17 BRPM | HEART RATE: 83 BPM

## 2019-02-23 PROCEDURE — 59025 FETAL NON-STRESS TEST: CPT

## 2019-02-23 PROCEDURE — 700102 HCHG RX REV CODE 250 W/ 637 OVERRIDE(OP): Performed by: OBSTETRICS & GYNECOLOGY

## 2019-02-23 PROCEDURE — A9270 NON-COVERED ITEM OR SERVICE: HCPCS | Performed by: OBSTETRICS & GYNECOLOGY

## 2019-02-23 PROCEDURE — 700112 HCHG RX REV CODE 229: Performed by: OBSTETRICS & GYNECOLOGY

## 2019-02-23 RX ORDER — NIFEDIPINE 90 MG/1
90 TABLET, EXTENDED RELEASE ORAL
Status: DISCONTINUED | OUTPATIENT
Start: 2019-02-23 | End: 2019-02-23 | Stop reason: HOSPADM

## 2019-02-23 RX ADMIN — DOCUSATE SODIUM 100 MG: 100 CAPSULE, LIQUID FILLED ORAL at 05:59

## 2019-02-23 RX ADMIN — Medication 1 TABLET: at 05:59

## 2019-02-23 RX ADMIN — ASPIRIN 81 MG 81 MG: 81 TABLET ORAL at 05:59

## 2019-02-23 RX ADMIN — VITAMIN D, TAB 1000IU (100/BT) 5000 UNITS: 25 TAB at 05:59

## 2019-02-23 RX ADMIN — NIFEDIPINE 90 MG: 90 TABLET, FILM COATED, EXTENDED RELEASE ORAL at 14:59

## 2019-02-23 NOTE — CARE PLAN
Problem: Risk for Infection, Impaired Wound Healing  Goal: Remain free from signs and symptoms of infection  Outcome: PROGRESSING AS EXPECTED  Patient remains afebrile. No S&S of infections     Problem: Pain  Goal: Alleviation of Pain or a reduction in pain to the patient's comfort goal  Outcome: PROGRESSING AS EXPECTED  Patient denies any pain or regular contractions.states she has an occasional contraction.  Patient educated to notify RN if any pain or contractions develop.

## 2019-02-23 NOTE — CARE PLAN
Problem: Risk for Deep Vein Thrombosis/Venous Thromboembolism  Goal: DVT/VTE Prevention Measures in Place    Intervention: Intermittent sequential compression device in place per MD order  SCDs in place and functioning      Problem: Risk for Infection, Impaired Wound Healing  Goal: Remain free from signs and symptoms of infection  Outcome: PROGRESSING AS EXPECTED  Pt is afebrile.    Problem: Risk for injury  Goal: Patient and fetus will be free of preventable injury/complications  Outcome: PROGRESSING AS EXPECTED  Pt placed on EFM and TOCO to monitor fetal well being and uterine activity. Reactive NS obtained.    Problem: Pain  Goal: Alleviation of Pain or a reduction in pain to the patient's comfort goal  Outcome: PROGRESSING AS EXPECTED  Pt denies pain.

## 2019-02-23 NOTE — PROGRESS NOTES
Pt denies any VB today.  Only occasional contractions. Just received Nifedipine.  Discussed D/C home today.  PTL precautions reviewed.  Pt to decrease activity - not strict bed rest.  F/U Dr. Westbrook 1 week..  Rx for Nifedipine SR 90 mg called into [facility].

## 2019-02-23 NOTE — PROGRESS NOTES
1900- Report received from TRINY Coto RN. Pt resting in bed. POC discussed. Pt denies LOF, UCs, or pain. Reports +FM and no VB since earlier episode on day shift.     1936- Reactive NST obtained.    2357- No VB noted when pt up to BR. Reports feeling approximately 1 UC per hour since previous assessment.     0600- Pt reports no VB this morning when up to BR and reports able to sleep. Not woken by UCs.    0700- Report given to IFEANYI Scott RN. POC discussed.

## 2019-02-23 NOTE — PROGRESS NOTES
0700- Report received from SHARATH Kelly RN .POC discussed, pt verbalized understanding. Patient is a  edc  making her 34.5 weeks.     0745- Patient denies any contractions, leaking of any fluid or any vaginal bleeding. States positive fetal movement. All questions answered, no further needs at this time. Assessment complete.     1530- Dr Martin into see patient. Discussed plan of care with patient. Order received to discharge home.     1700-  Patient given  labor precautions and to continue with kick counts. Patient states understanding of when to return to L&D regarding, contractions, VB, LOF or decrease fetal movement. Patient will call for follow up appt for next week. Patient discharged home with  in stable condition.

## 2019-02-24 NOTE — DISCHARGE INSTRUCTIONS
General Instructions:  · If you think you are in labor, time contractions (lying on your left side) from the beginning of one contraction to the beginning of the next contraction for at least one hour.  · Increase fluid intake: you should consume 10-12 8 oz glasses of non-caffeinated fluid per day.  · Report any pressure or burning on urination to your physician.  · Monitor fetal movement: If you notice an absence or decrease in fetal movement, drink a large glass of water and rest on your side.  If there is no increase in movement, call your physician or go to the hospital for further evaluation.  · Report any sudden, sharp abdominal pain.  · Report any bleeding.  Spotting or pinkish discharge is normal after vaginal exam.  You may also spot after sexual intercourse.    Pre-term Labor (<37 weeks):  Call your physician or return to the hospital if:  · You have painless regular contractions more than 4 in one hour.  · Your water breaks (remember time and color).  · You have menstrual-like cramps, a low dull backache or pressure in your pelvis or back.  · Your baby does not move enough to complete the daily kick count (10 movements in 2 hours).  · Your baby moves much less often than on the days before or you have not felt your baby move all day.  · Please review the MEDICATION LIST section of your AFTER VISIT SUMMARY document.  · Take your medication as prescribed      Other Instructions:    Take Procardia XL 90 mg once a day at 3 pm. Last dose was at 2/23 @ 3 pm.    Please carefully review your entire AFTER VISIT SUMMARY document for all discharge instructions.

## 2019-02-24 NOTE — DISCHARGE SUMMARY
DATE OF ADMISSION:  02/15/2019    DATE OF DISCHARGE:  2019    ADMITTING DIAGNOSES:  1.  Intrauterine pregnancy at 33-4/7 weeks.  2.   contractions.  3.  Vaginal bleeding.  4.  Advanced maternal age.  5.  Prior  section x3.    HISTORY AND PHYSICAL:  Please see previously dictated history and physical.    HOSPITAL COURSE:  The patient was admitted to labor and delivery with the   above complaints on 02/15/2019.  Her hospital course was significant for   receiving betamethasone x2 for fetal lung maturity.  She is given a short   course of magnesium sulfate.  She has been treated with oral tocolytics in the   form of nifedipine.  Her group B strep status is negative.  During her   hospitalization, she would have periods of increased contractions and was   given terbutaline on a few occasions to quiet her uterus.  She had episodes of   small amounts of bright red blood and dark blood, but overall no evidence of   a large abruption.  The patient has been stable now for approximately 24 hours   and has been switched to nifedipine 90 mg sustained release once a day and   with this medication, she has had only occasional contractions and no further   vaginal bleeding.  We discussed the possibility of sending her home today on   decreased activity, pelvic rest, continue nifedipine use, and the patient   agrees.   labor precautions have been thoroughly reviewed with the   patient.    PHYSICAL EXAMINATION:  VITAL SIGNS:  On discharge, temperature is 97.1, pulse 77, and blood pressure   112/69.  GENERAL:  She is pleasant, in no apparent distress.  LUNGS:  Clear to auscultation bilaterally.  CARDIOVASCULAR:  Regular rate and rhythm.  ABDOMEN:  Gravid and nontender.  EXTREMITIES:  Show +1 pedal edema.  She has no cords or Hom[place] sign.  PELVIC:  Fetal heart tones are in the 140s and a category 1 tracing.    Tocometry shows only occasional contractions.  Her cervical exam was deferred   today, but in the  past has been non-worrisome.    IMPRESSION:  This is a 38-year-old  4, para 3, now at 34 and 5/7th   weeks with  contractions.  No evidence of  labor and possible   small placental abruption who is improved.    PLAN:  1.  Patient will be discharged home with instructions to follow up with Dr. Westbrook in 1 week.  2.  She is given a prescription for nifedipine sustained release 90 mg 1 p.o.   daily.  3.  She will be on decreased activity at home.  4.  She will return to labor and delivery for any signs or symptoms of    labor or abruption.       ____________________________________     MD MELINA DURHAM / MICHAEL    DD:  2019 15:49:56  DT:  2019 18:07:32    D#:  [id number]  Job#:  [id number]    cc: ALVIN WESTBROOK MD

## 2019-02-25 ENCOUNTER — HOSPITAL ENCOUNTER (INPATIENT)
Facility: MEDICAL CENTER | Age: 39
LOS: 4 days | End: 2019-03-01
Attending: OBSTETRICS & GYNECOLOGY | Admitting: OBSTETRICS & GYNECOLOGY
Payer: COMMERCIAL

## 2019-02-25 LAB
BASOPHILS # BLD AUTO: 0.3 % (ref 0–1.8)
BASOPHILS # BLD: 0.05 K/UL (ref 0–0.12)
EOSINOPHIL # BLD AUTO: 0.34 K/UL (ref 0–0.51)
EOSINOPHIL NFR BLD: 2.2 % (ref 0–6.9)
ERYTHROCYTE [DISTWIDTH] IN BLOOD BY AUTOMATED COUNT: 42.5 FL (ref 35.9–50)
HCT VFR BLD AUTO: 41 % (ref 37–47)
HGB BLD-MCNC: 13.4 G/DL (ref 12–16)
HOLDING TUBE BB 8507: NORMAL
IMM GRANULOCYTES # BLD AUTO: 0.17 K/UL (ref 0–0.11)
IMM GRANULOCYTES NFR BLD AUTO: 1.1 % (ref 0–0.9)
LYMPHOCYTES # BLD AUTO: 2.96 K/UL (ref 1–4.8)
LYMPHOCYTES NFR BLD: 19.3 % (ref 22–41)
MCH RBC QN AUTO: 26.8 PG (ref 27–33)
MCHC RBC AUTO-ENTMCNC: 32.7 G/DL (ref 33.6–35)
MCV RBC AUTO: 82 FL (ref 81.4–97.8)
MONOCYTES # BLD AUTO: 0.97 K/UL (ref 0–0.85)
MONOCYTES NFR BLD AUTO: 6.3 % (ref 0–13.4)
NEUTROPHILS # BLD AUTO: 10.88 K/UL (ref 2–7.15)
NEUTROPHILS NFR BLD: 70.8 % (ref 44–72)
NRBC # BLD AUTO: 0 K/UL
NRBC BLD-RTO: 0 /100 WBC
PLATELET # BLD AUTO: 239 K/UL (ref 164–446)
PMV BLD AUTO: 12 FL (ref 9–12.9)
RBC # BLD AUTO: 5 M/UL (ref 4.2–5.4)
WBC # BLD AUTO: 15.4 K/UL (ref 4.8–10.8)

## 2019-02-25 PROCEDURE — 700101 HCHG RX REV CODE 250

## 2019-02-25 PROCEDURE — 36415 COLL VENOUS BLD VENIPUNCTURE: CPT

## 2019-02-25 PROCEDURE — 306828 HCHG ANES-TIME GENERAL: Performed by: OBSTETRICS & GYNECOLOGY

## 2019-02-25 PROCEDURE — 306288 HCHG RETRACTOR C SECTION LG

## 2019-02-25 PROCEDURE — 305385 HCHG SURGICAL SERVICES 1/4 HOUR: Performed by: OBSTETRICS & GYNECOLOGY

## 2019-02-25 PROCEDURE — 304966 HCHG RECOVERY SVSC TIME ADDL 1/2 HR: Performed by: OBSTETRICS & GYNECOLOGY

## 2019-02-25 PROCEDURE — 304964 HCHG RECOVERY ROOM TIME 1HR: Performed by: OBSTETRICS & GYNECOLOGY

## 2019-02-25 PROCEDURE — 700111 HCHG RX REV CODE 636 W/ 250 OVERRIDE (IP): Performed by: ANESTHESIOLOGY

## 2019-02-25 PROCEDURE — 88302 TISSUE EXAM BY PATHOLOGIST: CPT | Mod: 59

## 2019-02-25 PROCEDURE — 700111 HCHG RX REV CODE 636 W/ 250 OVERRIDE (IP)

## 2019-02-25 PROCEDURE — 85025 COMPLETE CBC W/AUTO DIFF WBC: CPT

## 2019-02-25 PROCEDURE — 59514 CESAREAN DELIVERY ONLY: CPT

## 2019-02-25 PROCEDURE — 700105 HCHG RX REV CODE 258

## 2019-02-25 PROCEDURE — 770002 HCHG ROOM/CARE - OB PRIVATE (112)

## 2019-02-25 PROCEDURE — 700102 HCHG RX REV CODE 250 W/ 637 OVERRIDE(OP): Performed by: ANESTHESIOLOGY

## 2019-02-25 PROCEDURE — 0UB70ZZ EXCISION OF BILATERAL FALLOPIAN TUBES, OPEN APPROACH: ICD-10-PCS | Performed by: OBSTETRICS & GYNECOLOGY

## 2019-02-25 PROCEDURE — 88307 TISSUE EXAM BY PATHOLOGIST: CPT

## 2019-02-25 PROCEDURE — 302258 HCHG LIGASURE SMALL JAW

## 2019-02-25 RX ORDER — HALOPERIDOL 5 MG/ML
1 INJECTION INTRAMUSCULAR
Status: DISCONTINUED | OUTPATIENT
Start: 2019-02-25 | End: 2019-02-25 | Stop reason: HOSPADM

## 2019-02-25 RX ORDER — HYDROMORPHONE HYDROCHLORIDE 1 MG/ML
0.4 INJECTION, SOLUTION INTRAMUSCULAR; INTRAVENOUS; SUBCUTANEOUS
Status: DISCONTINUED | OUTPATIENT
Start: 2019-02-25 | End: 2019-02-25 | Stop reason: HOSPADM

## 2019-02-25 RX ORDER — ONDANSETRON 2 MG/ML
4 INJECTION INTRAMUSCULAR; INTRAVENOUS
Status: DISCONTINUED | OUTPATIENT
Start: 2019-02-25 | End: 2019-02-25 | Stop reason: HOSPADM

## 2019-02-25 RX ORDER — ACETAMINOPHEN 500 MG
1000 TABLET ORAL EVERY 6 HOURS
Status: COMPLETED | OUTPATIENT
Start: 2019-02-25 | End: 2019-02-26

## 2019-02-25 RX ORDER — METOCLOPRAMIDE HYDROCHLORIDE 5 MG/ML
10 INJECTION INTRAMUSCULAR; INTRAVENOUS ONCE
Status: COMPLETED | OUTPATIENT
Start: 2019-02-25 | End: 2019-02-25

## 2019-02-25 RX ORDER — KETOROLAC TROMETHAMINE 30 MG/ML
30 INJECTION, SOLUTION INTRAMUSCULAR; INTRAVENOUS EVERY 6 HOURS
Status: COMPLETED | OUTPATIENT
Start: 2019-02-26 | End: 2019-02-26

## 2019-02-25 RX ORDER — HYDROMORPHONE HYDROCHLORIDE 1 MG/ML
0.1 INJECTION, SOLUTION INTRAMUSCULAR; INTRAVENOUS; SUBCUTANEOUS
Status: DISCONTINUED | OUTPATIENT
Start: 2019-02-25 | End: 2019-02-25 | Stop reason: HOSPADM

## 2019-02-25 RX ORDER — METOPROLOL TARTRATE 1 MG/ML
1 INJECTION, SOLUTION INTRAVENOUS
Status: DISCONTINUED | OUTPATIENT
Start: 2019-02-25 | End: 2019-02-25 | Stop reason: HOSPADM

## 2019-02-25 RX ORDER — OXYCODONE HYDROCHLORIDE 5 MG/1
5 TABLET ORAL EVERY 4 HOURS PRN
Status: ACTIVE | OUTPATIENT
Start: 2019-02-25 | End: 2019-02-26

## 2019-02-25 RX ORDER — HYDROMORPHONE HYDROCHLORIDE 1 MG/ML
0.2 INJECTION, SOLUTION INTRAMUSCULAR; INTRAVENOUS; SUBCUTANEOUS
Status: DISCONTINUED | OUTPATIENT
Start: 2019-02-25 | End: 2019-02-25 | Stop reason: HOSPADM

## 2019-02-25 RX ORDER — MIDAZOLAM HYDROCHLORIDE 1 MG/ML
1 INJECTION INTRAMUSCULAR; INTRAVENOUS
Status: DISCONTINUED | OUTPATIENT
Start: 2019-02-25 | End: 2019-02-25 | Stop reason: HOSPADM

## 2019-02-25 RX ORDER — SODIUM CHLORIDE, SODIUM GLUCONATE, SODIUM ACETATE, POTASSIUM CHLORIDE AND MAGNESIUM CHLORIDE 526; 502; 368; 37; 30 MG/100ML; MG/100ML; MG/100ML; MG/100ML; MG/100ML
1500 INJECTION, SOLUTION INTRAVENOUS ONCE
Status: COMPLETED | OUTPATIENT
Start: 2019-02-25 | End: 2019-02-25

## 2019-02-25 RX ORDER — CITRIC ACID/SODIUM CITRATE 334-500MG
30 SOLUTION, ORAL ORAL ONCE
Status: COMPLETED | OUTPATIENT
Start: 2019-02-25 | End: 2019-02-25

## 2019-02-25 RX ORDER — HYDROMORPHONE HYDROCHLORIDE 1 MG/ML
0.2 INJECTION, SOLUTION INTRAMUSCULAR; INTRAVENOUS; SUBCUTANEOUS
Status: ACTIVE | OUTPATIENT
Start: 2019-02-25 | End: 2019-02-26

## 2019-02-25 RX ORDER — OXYCODONE HCL 5 MG/5 ML
10 SOLUTION, ORAL ORAL
Status: DISCONTINUED | OUTPATIENT
Start: 2019-02-25 | End: 2019-02-25 | Stop reason: HOSPADM

## 2019-02-25 RX ORDER — DOCUSATE SODIUM 100 MG/1
100 CAPSULE, LIQUID FILLED ORAL 2 TIMES DAILY PRN
Status: DISCONTINUED | OUTPATIENT
Start: 2019-02-25 | End: 2019-03-01 | Stop reason: HOSPADM

## 2019-02-25 RX ORDER — METHYLERGONOVINE MALEATE 0.2 MG/ML
0.2 INJECTION INTRAVENOUS
Status: DISCONTINUED | OUTPATIENT
Start: 2019-02-25 | End: 2019-03-01 | Stop reason: HOSPADM

## 2019-02-25 RX ORDER — OXYCODONE HYDROCHLORIDE 10 MG/1
10 TABLET ORAL EVERY 4 HOURS PRN
Status: ACTIVE | OUTPATIENT
Start: 2019-02-25 | End: 2019-02-26

## 2019-02-25 RX ORDER — SODIUM CHLORIDE, SODIUM LACTATE, POTASSIUM CHLORIDE, CALCIUM CHLORIDE 600; 310; 30; 20 MG/100ML; MG/100ML; MG/100ML; MG/100ML
INJECTION, SOLUTION INTRAVENOUS CONTINUOUS
Status: DISCONTINUED | OUTPATIENT
Start: 2019-02-25 | End: 2019-03-01 | Stop reason: HOSPADM

## 2019-02-25 RX ORDER — ONDANSETRON 2 MG/ML
4 INJECTION INTRAMUSCULAR; INTRAVENOUS EVERY 6 HOURS PRN
Status: ACTIVE | OUTPATIENT
Start: 2019-02-25 | End: 2019-02-26

## 2019-02-25 RX ORDER — MISOPROSTOL 200 UG/1
800 TABLET ORAL
Status: DISCONTINUED | OUTPATIENT
Start: 2019-02-25 | End: 2019-03-01 | Stop reason: HOSPADM

## 2019-02-25 RX ORDER — OXYCODONE HCL 5 MG/5 ML
5 SOLUTION, ORAL ORAL
Status: DISCONTINUED | OUTPATIENT
Start: 2019-02-25 | End: 2019-02-25 | Stop reason: HOSPADM

## 2019-02-25 RX ORDER — DIPHENHYDRAMINE HYDROCHLORIDE 50 MG/ML
12.5 INJECTION INTRAMUSCULAR; INTRAVENOUS
Status: DISCONTINUED | OUTPATIENT
Start: 2019-02-25 | End: 2019-02-25 | Stop reason: HOSPADM

## 2019-02-25 RX ORDER — VITAMIN A ACETATE, BETA CAROTENE, ASCORBIC ACID, CHOLECALCIFEROL, .ALPHA.-TOCOPHEROL ACETATE, DL-, THIAMINE MONONITRATE, RIBOFLAVIN, NIACINAMIDE, PYRIDOXINE HYDROCHLORIDE, FOLIC ACID, CYANOCOBALAMIN, CALCIUM CARBONATE, FERROUS FUMARATE, ZINC OXIDE, CUPRIC OXIDE 3080; 12; 120; 400; 1; 1.84; 3; 20; 22; 920; 25; 200; 27; 10; 2 [IU]/1; UG/1; MG/1; [IU]/1; MG/1; MG/1; MG/1; MG/1; MG/1; [IU]/1; MG/1; MG/1; MG/1; MG/1; MG/1
1 TABLET, FILM COATED ORAL EVERY MORNING
Status: DISCONTINUED | OUTPATIENT
Start: 2019-02-26 | End: 2019-03-01 | Stop reason: HOSPADM

## 2019-02-25 RX ORDER — SIMETHICONE 80 MG
80 TABLET,CHEWABLE ORAL 4 TIMES DAILY PRN
Status: DISCONTINUED | OUTPATIENT
Start: 2019-02-25 | End: 2019-03-01 | Stop reason: HOSPADM

## 2019-02-25 RX ORDER — HYDRALAZINE HYDROCHLORIDE 20 MG/ML
5 INJECTION INTRAMUSCULAR; INTRAVENOUS
Status: DISCONTINUED | OUTPATIENT
Start: 2019-02-25 | End: 2019-02-25 | Stop reason: HOSPADM

## 2019-02-25 RX ORDER — DIPHENHYDRAMINE HYDROCHLORIDE 50 MG/ML
25 INJECTION INTRAMUSCULAR; INTRAVENOUS EVERY 6 HOURS PRN
Status: ACTIVE | OUTPATIENT
Start: 2019-02-25 | End: 2019-02-26

## 2019-02-25 RX ORDER — DIPHENHYDRAMINE HYDROCHLORIDE 50 MG/ML
12.5 INJECTION INTRAMUSCULAR; INTRAVENOUS EVERY 6 HOURS PRN
Status: ACTIVE | OUTPATIENT
Start: 2019-02-25 | End: 2019-02-26

## 2019-02-25 RX ORDER — SODIUM CHLORIDE, SODIUM GLUCONATE, SODIUM ACETATE, POTASSIUM CHLORIDE AND MAGNESIUM CHLORIDE 526; 502; 368; 37; 30 MG/100ML; MG/100ML; MG/100ML; MG/100ML; MG/100ML
INJECTION, SOLUTION INTRAVENOUS
Status: COMPLETED
Start: 2019-02-25 | End: 2019-02-25

## 2019-02-25 RX ORDER — MEPERIDINE HYDROCHLORIDE 25 MG/ML
12.5 INJECTION INTRAMUSCULAR; INTRAVENOUS; SUBCUTANEOUS
Status: DISCONTINUED | OUTPATIENT
Start: 2019-02-25 | End: 2019-02-25 | Stop reason: HOSPADM

## 2019-02-25 RX ORDER — SODIUM CHLORIDE, SODIUM LACTATE, POTASSIUM CHLORIDE, CALCIUM CHLORIDE 600; 310; 30; 20 MG/100ML; MG/100ML; MG/100ML; MG/100ML
INJECTION, SOLUTION INTRAVENOUS PRN
Status: DISCONTINUED | OUTPATIENT
Start: 2019-02-25 | End: 2019-03-01 | Stop reason: HOSPADM

## 2019-02-25 RX ORDER — HYDROMORPHONE HYDROCHLORIDE 1 MG/ML
0.4 INJECTION, SOLUTION INTRAMUSCULAR; INTRAVENOUS; SUBCUTANEOUS
Status: ACTIVE | OUTPATIENT
Start: 2019-02-25 | End: 2019-02-26

## 2019-02-25 RX ADMIN — METOCLOPRAMIDE 10 MG: 5 INJECTION, SOLUTION INTRAMUSCULAR; INTRAVENOUS at 17:47

## 2019-02-25 RX ADMIN — SODIUM CITRATE AND CITRIC ACID MONOHYDRATE 30 ML: 500; 334 SOLUTION ORAL at 17:47

## 2019-02-25 RX ADMIN — SODIUM CHLORIDE, SODIUM GLUCONATE, SODIUM ACETATE, POTASSIUM CHLORIDE AND MAGNESIUM CHLORIDE 1500 ML: 526; 502; 368; 37; 30 INJECTION, SOLUTION INTRAVENOUS at 17:49

## 2019-02-25 RX ADMIN — OXYTOCIN 125 ML/HR: 10 INJECTION, SOLUTION INTRAMUSCULAR; INTRAVENOUS at 20:26

## 2019-02-25 RX ADMIN — FAMOTIDINE 20 MG: 10 INJECTION INTRAVENOUS at 17:47

## 2019-02-25 ASSESSMENT — LIFESTYLE VARIABLES: EVER_SMOKED: NEVER

## 2019-02-26 LAB
ERYTHROCYTE [DISTWIDTH] IN BLOOD BY AUTOMATED COUNT: 44.4 FL (ref 35.9–50)
HCT VFR BLD AUTO: 33.7 % (ref 37–47)
HGB BLD-MCNC: 10.7 G/DL (ref 12–16)
MCH RBC QN AUTO: 26.8 PG (ref 27–33)
MCHC RBC AUTO-ENTMCNC: 31.8 G/DL (ref 33.6–35)
MCV RBC AUTO: 84.3 FL (ref 81.4–97.8)
PATHOLOGY CONSULT NOTE: NORMAL
PLATELET # BLD AUTO: 178 K/UL (ref 164–446)
PMV BLD AUTO: 12.1 FL (ref 9–12.9)
RBC # BLD AUTO: 4 M/UL (ref 4.2–5.4)
WBC # BLD AUTO: 13.3 K/UL (ref 4.8–10.8)

## 2019-02-26 PROCEDURE — 700111 HCHG RX REV CODE 636 W/ 250 OVERRIDE (IP)

## 2019-02-26 PROCEDURE — 700111 HCHG RX REV CODE 636 W/ 250 OVERRIDE (IP): Performed by: OBSTETRICS & GYNECOLOGY

## 2019-02-26 PROCEDURE — 700102 HCHG RX REV CODE 250 W/ 637 OVERRIDE(OP): Performed by: ANESTHESIOLOGY

## 2019-02-26 PROCEDURE — 90471 IMMUNIZATION ADMIN: CPT

## 2019-02-26 PROCEDURE — 700102 HCHG RX REV CODE 250 W/ 637 OVERRIDE(OP): Performed by: OBSTETRICS & GYNECOLOGY

## 2019-02-26 PROCEDURE — 700111 HCHG RX REV CODE 636 W/ 250 OVERRIDE (IP): Performed by: ANESTHESIOLOGY

## 2019-02-26 PROCEDURE — 770002 HCHG ROOM/CARE - OB PRIVATE (112)

## 2019-02-26 PROCEDURE — A9270 NON-COVERED ITEM OR SERVICE: HCPCS | Performed by: ANESTHESIOLOGY

## 2019-02-26 PROCEDURE — 90686 IIV4 VACC NO PRSV 0.5 ML IM: CPT | Performed by: OBSTETRICS & GYNECOLOGY

## 2019-02-26 PROCEDURE — 36415 COLL VENOUS BLD VENIPUNCTURE: CPT

## 2019-02-26 PROCEDURE — 85027 COMPLETE CBC AUTOMATED: CPT

## 2019-02-26 PROCEDURE — 90715 TDAP VACCINE 7 YRS/> IM: CPT

## 2019-02-26 PROCEDURE — 3E02340 INTRODUCTION OF INFLUENZA VACCINE INTO MUSCLE, PERCUTANEOUS APPROACH: ICD-10-PCS | Performed by: OBSTETRICS & GYNECOLOGY

## 2019-02-26 PROCEDURE — A9270 NON-COVERED ITEM OR SERVICE: HCPCS | Performed by: OBSTETRICS & GYNECOLOGY

## 2019-02-26 RX ORDER — KETOROLAC TROMETHAMINE 30 MG/ML
30 INJECTION, SOLUTION INTRAMUSCULAR; INTRAVENOUS EVERY 6 HOURS
Status: DISCONTINUED | OUTPATIENT
Start: 2019-02-26 | End: 2019-02-27

## 2019-02-26 RX ORDER — OXYCODONE HYDROCHLORIDE 10 MG/1
10 TABLET ORAL EVERY 4 HOURS PRN
Status: DISCONTINUED | OUTPATIENT
Start: 2019-02-26 | End: 2019-03-01 | Stop reason: HOSPADM

## 2019-02-26 RX ORDER — ACETAMINOPHEN 325 MG/1
325 TABLET ORAL EVERY 4 HOURS PRN
Status: DISCONTINUED | OUTPATIENT
Start: 2019-02-26 | End: 2019-03-01 | Stop reason: HOSPADM

## 2019-02-26 RX ORDER — DIPHENHYDRAMINE HCL 25 MG
25 TABLET ORAL EVERY 6 HOURS PRN
Status: DISCONTINUED | OUTPATIENT
Start: 2019-02-26 | End: 2019-03-01 | Stop reason: HOSPADM

## 2019-02-26 RX ORDER — ONDANSETRON 4 MG/1
4 TABLET, ORALLY DISINTEGRATING ORAL EVERY 6 HOURS PRN
Status: DISCONTINUED | OUTPATIENT
Start: 2019-02-26 | End: 2019-03-01 | Stop reason: HOSPADM

## 2019-02-26 RX ORDER — METOCLOPRAMIDE HYDROCHLORIDE 5 MG/ML
10 INJECTION INTRAMUSCULAR; INTRAVENOUS EVERY 6 HOURS PRN
Status: DISCONTINUED | OUTPATIENT
Start: 2019-02-26 | End: 2019-03-01 | Stop reason: HOSPADM

## 2019-02-26 RX ORDER — DIPHENHYDRAMINE HYDROCHLORIDE 50 MG/ML
25 INJECTION INTRAMUSCULAR; INTRAVENOUS EVERY 6 HOURS PRN
Status: DISCONTINUED | OUTPATIENT
Start: 2019-02-26 | End: 2019-03-01 | Stop reason: HOSPADM

## 2019-02-26 RX ORDER — MORPHINE SULFATE 4 MG/ML
4 INJECTION, SOLUTION INTRAMUSCULAR; INTRAVENOUS
Status: DISCONTINUED | OUTPATIENT
Start: 2019-02-26 | End: 2019-03-01 | Stop reason: HOSPADM

## 2019-02-26 RX ORDER — ONDANSETRON 2 MG/ML
4 INJECTION INTRAMUSCULAR; INTRAVENOUS EVERY 6 HOURS PRN
Status: DISCONTINUED | OUTPATIENT
Start: 2019-02-26 | End: 2019-03-01 | Stop reason: HOSPADM

## 2019-02-26 RX ORDER — IBUPROFEN 600 MG/1
600 TABLET ORAL EVERY 6 HOURS PRN
Status: DISCONTINUED | OUTPATIENT
Start: 2019-02-26 | End: 2019-03-01 | Stop reason: HOSPADM

## 2019-02-26 RX ORDER — OXYCODONE HYDROCHLORIDE 5 MG/1
5 TABLET ORAL EVERY 4 HOURS PRN
Status: DISCONTINUED | OUTPATIENT
Start: 2019-02-26 | End: 2019-03-01 | Stop reason: HOSPADM

## 2019-02-26 RX ADMIN — KETOROLAC TROMETHAMINE 30 MG: 30 INJECTION, SOLUTION INTRAMUSCULAR; INTRAVENOUS at 12:25

## 2019-02-26 RX ADMIN — KETOROLAC TROMETHAMINE 30 MG: 30 INJECTION, SOLUTION INTRAMUSCULAR; INTRAVENOUS at 00:40

## 2019-02-26 RX ADMIN — KETOROLAC TROMETHAMINE 30 MG: 30 INJECTION, SOLUTION INTRAMUSCULAR; INTRAVENOUS at 18:21

## 2019-02-26 RX ADMIN — TETANUS TOXOID, REDUCED DIPHTHERIA TOXOID AND ACELLULAR PERTUSSIS VACCINE, ADSORBED 0.5 ML: 5; 2.5; 8; 8; 2.5 SUSPENSION INTRAMUSCULAR at 00:40

## 2019-02-26 RX ADMIN — ACETAMINOPHEN 1000 MG: 500 TABLET ORAL at 00:38

## 2019-02-26 RX ADMIN — Medication 1 TABLET: at 05:57

## 2019-02-26 RX ADMIN — ACETAMINOPHEN 1000 MG: 500 TABLET ORAL at 12:25

## 2019-02-26 RX ADMIN — ACETAMINOPHEN 1000 MG: 500 TABLET ORAL at 18:21

## 2019-02-26 RX ADMIN — KETOROLAC TROMETHAMINE 30 MG: 30 INJECTION, SOLUTION INTRAMUSCULAR; INTRAVENOUS at 05:57

## 2019-02-26 RX ADMIN — INFLUENZA A VIRUS A/MICHIGAN/45/2015 X-275 (H1N1) ANTIGEN (FORMALDEHYDE INACTIVATED), INFLUENZA A VIRUS A/SINGAPORE/INFIMH-16-0019/2016 IVR-186 (H3N2) ANTIGEN (FORMALDEHYDE INACTIVATED), INFLUENZA B VIRUS B/PHUKET/3073/2013 ANTIGEN (FORMALDEHYDE INACTIVATED), AND INFLUENZA B VIRUS B/MARYLAND/15/2016 BX-69A ANTIGEN (FORMALDEHYDE INACTIVATED) 0.5 ML: 15; 15; 15; 15 INJECTION, SUSPENSION INTRAMUSCULAR at 00:39

## 2019-02-26 RX ADMIN — ACETAMINOPHEN 1000 MG: 500 TABLET ORAL at 05:56

## 2019-02-26 ASSESSMENT — EDINBURGH POSTNATAL DEPRESSION SCALE (EPDS)
THINGS HAVE BEEN GETTING ON TOP OF ME: NO, I HAVE BEEN COPING AS WELL AS EVER
I HAVE BEEN ABLE TO LAUGH AND SEE THE FUNNY SIDE OF THINGS: AS MUCH AS I ALWAYS COULD
THE THOUGHT OF HARMING MYSELF HAS OCCURRED TO ME: NEVER
I HAVE BEEN SO UNHAPPY THAT I HAVE HAD DIFFICULTY SLEEPING: NOT AT ALL
I HAVE FELT SAD OR MISERABLE: NO, NOT AT ALL
I HAVE FELT SCARED OR PANICKY FOR NO GOOD REASON: NO, NOT AT ALL
I HAVE LOOKED FORWARD WITH ENJOYMENT TO THINGS: AS MUCH AS I EVER DID
I HAVE BLAMED MYSELF UNNECESSARILY WHEN THINGS WENT WRONG: NO, NEVER
I HAVE BEEN ANXIOUS OR WORRIED FOR NO GOOD REASON: NO, NOT AT ALL
I HAVE BEEN SO UNHAPPY THAT I HAVE BEEN CRYING: NO, NEVER

## 2019-02-26 NOTE — LACTATION NOTE
Followup visit done. Baby to breast with deep latch and infrequent swallows observed. MOB breasts wide spaced and she has a history of insufficient milk supply for her 3 other children.  Baby also given last of DM bottle.  Discussed the LPI and why it is important to feed regularly, offer breast and follow with DM, then pumping.  Encouraged to pump 8 times every 24 hours, encouraged every 2 hours during daytime and evening with one 5 hour stretch of no pumping at night. Encouraged to rent HG pump for discharge due to her history of insufficient milk.   Plan:  Skin to skin care while MOB is awake.  Offer breast on cue or every 3 hours.  Pump after feedings.  Offer donor milk after breastfeeds per gold sheet for LPI.

## 2019-02-26 NOTE — PROGRESS NOTES
POD 1---      Afebrile, VS normal, UO adequate  + flatus, tolerating clear liquids  Well, denies N/V    LAB:        2019 17:30 2019 05:02   WBC 15.4 (H) 13.3 (H)   Hemoglobin 13.4 10.7 (L)   Hematocrit 41.0 33.7 (L)   Platelet Count 239 178       PE:     Lungs clear to auscultation  Abdomen soft, flat, bowel sounds present and normal  Wound dressing in place, waterproof barrier intact  Calves nontender, Dhara sign negative bilaterally  Back:  No CVA tenderness     PLAN: Postop care, advance diet as tolerated, increase activity as tolerated.

## 2019-02-26 NOTE — OP REPORT
DATE OF SERVICE:  2019    OPERATIONS:  1.  Repeat low transverse  section.  2.  Bilateral salpingectomy.    SURGEON:  Aryan Westbrook MD    ASSISTANT:  Dr. Niki Corral.    ANESTHESIOLOGIST: Ronaldo Romo D.O.    ANESTHESIA:  Spinal.    PREOPERATIVE DIAGNOSES:  1.  35-week gestation.  2.  Active labor.  3.  Three previous  sections.  4.  Patient requests permanent sterilization.    POSTOPERATIVE DIAGNOSES:  1.  35-week gestation.  2.  Active labor.  3.  Three previous  sections.  4.  Patient requests permanent sterilization.    COMPLICATIONS:  None.    ESTIMATED BLOOD LOSS:  800 mL    BABY:  Male, 1-minute Apgar 8 and 5-minute Apgar 8, weight 3130 g.    SPECIMENS SENT TO PATHOLOGY:  Placenta, both fallopian tubes    CORD GASES:  Umbilical artery, pH 7.27, pCO2 of 57.5, base excess -3.    Umbilical vein, pH 7.33, pCO2 of 50.2, base excess -1.    INDICATIONS:  This 38-year-old lady is now .  She presented at 35 weeks   gestation in active labor with intact membranes.  She was 7 cm dilated.  Fetal   monitoring was reassuring.  Her antepartum group B strep culture was noted to   be negative.  There was a plan in place for repeat  section and   bilateral salpingectomy, so we quickly made preparations for this procedure.    OPERATION:  The patient went to the OR, spinal anesthesia was administered.    She was prepped and draped.  Time-out was done.  I made a Pfannenstiel skin   incision through a preexisting scar.  I incised a thick layer of subcutaneous   fat.  I incised the rectus fascia transversely.  The fascial layers were   distorted by multiple prior surgeries.  I dissected scar tissue to separate   the fascia from the rectus muscles.  I entered the peritoneum in the midline   well away from the bladder.  The peritoneal incision was enlarged.  Vick O   retractor was placed.  The lower uterus was intact.  I made a low transverse   myometrial incision.  I  pierced the membranes with a blunt instrument,   encountering clear fluid.  The baby was presenting   occiput posterior, but I   rotated the baby easily and delivered the baby   occiput anterior.  I bulb   suctioned the mouth and nares.  There were no nuchal cords.  The shoulders and   body delivered easily.  I wrapped the baby in a plastic bag for warmth and 45   seconds later, I triply clamped and cut the cord.  A segment of cord was sent   for cord gas analysis.  Gentle cord traction and fundal massage produced the   intact placenta and all attached membranes: I asked that the placenta go to pathology.    I made sure there were no retained products of conception.  I closed the full   thickness of myometrium with running interlocking 0 Vicryl obtaining good   approximation and hemostasis.    Both fallopian tubes and ovaries were inspected and appeared normal.    Bilateral salpingectomy was accomplished using the LigaSure vessel sealer.  I   excised approximately the distal two-thirds of each fallopian tube, including   the entire fimbriated end on both sides.  I doubly sealed and divided the   fibrovascular attachments between the fimbria ovarica and the ovaries, and   then progressed up the mesosalpinx with the vessel sealer sealing and dividing   as I went.  Both fallopian tubes were doubly sealed and then divided   approximately 1.5 cm from the uterus, and both tubal segments were sent to   pathology.  There was good hemostasis.    I closed the anterior parietal peritoneum and the posterior layer of the   rectus sheath with running 2-0 Vicryl.  I closed both layers of the rectus   abdominis fascia with running 0 PDS.  I closed Farhad's fascia with running   3-0 Vicryl.  I closed the skin with the Insorb resorbable subcutaneous   staples, half-inch wide Steri-Strips and a Mepilex Ag dressing were placed.    Sponge and needle counts were correct.       ____________________________________     ALVIN CARRANZA  MD URIOSTEGUI / MICHAEL    DD:  02/25/2019 19:32:13  DT:  02/25/2019 20:21:29    D#:  [id number]  Job#:  [id number]    cc: BESS SYLVESTER MD

## 2019-02-26 NOTE — PROGRESS NOTES
1720-pt presents from home with c/o uc's since 1600 this afternoon, no c/o leaking, bleeding or other pain, states baby is moving normally, placed on external monitors, vs taken, SVE 6/100/BBOW, no presenting part felt, TC Dr Xie, wants Dr Westbrook called  1728-TC Dr Westbrook, message left  1732-TC Dr Xie, admission order received, will talk to Dr Westbrook  1735-report given to CHIQUITA Roberts RN, POC discussed, pt readied for c/s

## 2019-02-26 NOTE — PROGRESS NOTES
ADMIT H AND P DICTATED    DX    35 weeks gestation  Active labor, 7 cm dilated, membranes intact  3 prior C-sections  Requests sterilization    PLAN    Repeat , bilateral salpingectomy

## 2019-02-26 NOTE — OR SURGEON
Immediate Post OP Note    PreOp Diagnosis:     35 weeks  Labor  3 prior c-sections  Desires sterility    PostOp Diagnosis:     same    Procedure(s):  REPEAT C SECTION WITH BILATERAL SALPINGECTOMY - Wound Class: Clean Contaminated    Surgeon(s):  LASHONDA Small M.D.    Anesthesiologist/Type of Anesthesia:  Anesthesiologist: Ronaldo Romo D.O./Spinal    Surgical Staff:  Circulator: Sharon Roberts R.N.  Scrub Person: Tawanna Srivastava  L&RADHA Baby  Nurse: Megan Vasquez R.N.; Ashely Barbosa RURSULA    Specimens removed if any:  Placenta to path    Estimated Blood Loss: 800 cc    Findings: baby:  Male, APGARs 8-8, 3130 grams    Results for SHAWN PASCUAL (MRN [id number]) as of 2/25/2019 19:27   2/25/2019 18:38   Cord Bg Ph 7.27   Cord Bg Pco2 57.5   Cord Bg Po2 14.2   Cord Bg Hco3 26   Cord Bg Base Excess -3   Cord Bg O2 Saturation 25.2   CV Ph 7.33   CV Pco2 50.2   CV Po2 18.6   CV Hco3 26   CV Base Excess -1       Complications: none        2/25/2019 7:32 PM Aryan Westbrook M.D.

## 2019-02-26 NOTE — H&P
DATE OF ADMISSION:  2019    CHIEF COMPLAINT:  Contractions.    HISTORY OF PRESENT ILLNESS:  The patient is a 38-year-old lady,  4,   para 3 , at 35 weeks' gestation.  She was recently hospitalized for   bleeding, felt to be secondary to low-grade placental abruption, as well as   contractions.  Her group B strep culture was negative during that   hospitalization.  She went home on nifedipine sustained release 90 mg once a   day for tocolysis and did well until 1600 today.  She now comes in with   frequent painful contractions.  Her cervix is 7 cm dilated with the baby's   head at -2 station, bulging membranes.  Fetal monitoring is reassuring.  Vital   signs are normal.  She has 3 previous C-sections.  We plan repeat    and bilateral salpingectomy for sterilization.    OBSTETRIC HISTORY:  She had a primary  in  for HELLP syndrome at   36 weeks' gestation.  Second and third pregnancies were delivered by a repeat   , with no preeclampsia of any severity.    PAST SURGICAL HISTORY:   x3.    MEDICATIONS:  1.  Prenatal vitamin daily.  2.  Nifedipine sustained release 90 mg every 24 hours.  3.  Aspirin 81 mg p.o. daily.    ALLERGIES:  No known drug allergies.    PAST MEDICAL HISTORY:  Positive for HELLP syndrome with her first pregnancy in   .    PHYSICAL EXAMINATION:  VITAL SIGNS:  Temperature 98.2, /77, pulse 86, respirations 20.  HEENT:  Normal.  LUNGS:  Clear.  HEART:  Sounds normal.  ABDOMEN:  Fundal height is generous.  EXTREMITIES:  No edema.  Homans negative.  DTRs normal.  PELVIC:  Cervix 7 cm dilated, completely effaced with bulging membranes,   cephalic presentation.  Bedside ultrasound confirms cephalic presentation.    Results for JOSE PASCUAL (MRN [id number]) as of 2019 19:22   2019 17:30   WBC 15.4 (H)   Hemoglobin 13.4   Hematocrit 41.0   Platelet Count 239       DIAGNOSES:  1.  A 35 and 0/7th week gestation.  2.  Active labor.  3.   Three previous  sections, plan repeat  section.  4.  Patient requests permanent sterilization.    PLAN:  Repeat low transverse  section and bilateral salpingectomy.       ____________________________________     MD MOODY JAQUEZ / MICHAEL    DD:  2019 17:51:32  DT:  2019 19:15:12    D#:  [id number]  Job#:  [id number]

## 2019-02-26 NOTE — CARE PLAN
Problem: Potential for postpartum infection related to surgical incision, compromised uterine condition, urinary tract or respiratory compromise  Goal: Patient will be afebrile and free from signs and symptoms of infection  Outcome: PROGRESSING AS EXPECTED  Patient remains afebrile and free from signs or symptoms of infection. Will continue to monitor.     Problem: Alteration in comfort related to surgical incision and/or after birth pains  Goal: Patient verbalizes acceptable pain level  Outcome: PROGRESSING AS EXPECTED  Patient verbalizes acceptable pain level. Will continue to monitor.

## 2019-02-26 NOTE — CONSULTS
Lactation note:    MOB requested to start pumping due to previous hx of low milk supply with her other children.  Discussed breastfeeding the LPI, and what to watch out for during feedings. Reviewed Late  Infant information on page 31 of New Beginning booklet.     Discussed normal course of breastfeeding and what to expect at 12-24-48-72 hours. Encouraged frequent skin to skin, and to continue offering breast every 2-3 hours.  New Beginnings booklet was given, and reviewed.      Plan for today is to continue to offer breast first every 3 hours or with cues, to avoid feeds longer than 5-10 minutes, then to supplement according to goldenrod guidelines with donor milk or formula, depending on mother's choice, to support infants caloric requirements. Then to use HG pump to help stimulate and protect supply for 15 minutes.     MOB has HHP and made aware of outpatient lactation assistance available at the [facility], and invited her to [facility]. Will provide her with pump application, and suggested to wait on obtaining her personal insurance pump at this time, as may need to rent HG pump until milk supply is established.   MOB verbalized understanding of feeding plan.   MOB has no other questions or concerns regarding breastfeeding. Encouraged to call for assistance as needed.

## 2019-02-26 NOTE — SENIOR ADMIT NOTE
173- Report from LUCAS Varela RN, pre-op procedures in progress    -  delivery viable male, APGARs 2040- Pt transferred to PPU with infant in arms. Bedside report to PPRN    - Placenta and tubal segments sent to pathology via dumb . Winter in lab notified

## 2019-02-26 NOTE — PROGRESS NOTES
Patient arrived to [address] with infant, SO, and belongings. Discussed POC, and oriented patient to call light, emergency cords, educational videos, and unit policies. All questions answered. Baby bands and cuddles verified cedric Herrera RN.     0330- patient up to bathroom with 2 person standby assistance. No light headedness or dizziness.

## 2019-02-27 PROCEDURE — 770002 HCHG ROOM/CARE - OB PRIVATE (112)

## 2019-02-27 PROCEDURE — A9270 NON-COVERED ITEM OR SERVICE: HCPCS | Performed by: OBSTETRICS & GYNECOLOGY

## 2019-02-27 PROCEDURE — 700102 HCHG RX REV CODE 250 W/ 637 OVERRIDE(OP): Performed by: OBSTETRICS & GYNECOLOGY

## 2019-02-27 RX ADMIN — Medication 1 TABLET: at 06:17

## 2019-02-27 RX ADMIN — IBUPROFEN 600 MG: 600 TABLET ORAL at 06:22

## 2019-02-27 RX ADMIN — IBUPROFEN 600 MG: 600 TABLET ORAL at 15:40

## 2019-02-27 RX ADMIN — IBUPROFEN 600 MG: 600 TABLET ORAL at 22:05

## 2019-02-27 NOTE — PROGRESS NOTES
POD 2---      Afebrile, VS normal, UO adequate  + flatus, tolerating regular diet well, denies N/V    LAB:        2019 17:30 2019 05:02   WBC 15.4 (H) 13.3 (H)   Hemoglobin 13.4 10.7 (L)   Hematocrit 41.0 33.7 (L)   Platelet Count 239 178       PE:     Lungs clear to auscultation  Abdomen soft, flat, bowel sounds present and normal  Wound dressing in place, waterproof barrier intact  Calves nontender, Dhara sign negative bilaterally  Back:  No CVA tenderness     PLAN: Postop care, analgesia as needed, diet as tolerated,  activity as tolerated. Patient planning on discharge:  tomorrow .    Followup plans:   Thursday, 2019, for wound check and dressing removal. .

## 2019-02-27 NOTE — LACTATION NOTE
MOB reports plan has been going well. Infant is doing better latching, but does not sustain feed, so she follows with DBM and pumping plan, feeding back the drops she gets to infant. She has already rented the HG pump and the POC is to discharge tomorrow. Encourage to make a f/u appt @ [facility] for a 1:1 consultation on the day the primary care provider first sees her baby. Encouraged to attend the [facility] with dates and times given.

## 2019-02-27 NOTE — PROGRESS NOTES
Patient assessment is complete, wnl. Fundus is firm with minimal discharge. Patient ambulating and voiding without difficulty. Bonding well with infant. Breast pumping well. Plan of care discussed with patient. Questions answered. Encouraged to call with needs. Will monitor.

## 2019-02-28 PROBLEM — Z30.2 REQUEST FOR STERILIZATION: Status: ACTIVE | Noted: 2019-02-28

## 2019-02-28 PROBLEM — N85.A UTERINE SCAR FROM PREVIOUS CESAREAN DELIVERY: Status: ACTIVE | Noted: 2019-02-28

## 2019-02-28 PROCEDURE — 700102 HCHG RX REV CODE 250 W/ 637 OVERRIDE(OP): Performed by: OBSTETRICS & GYNECOLOGY

## 2019-02-28 PROCEDURE — 770002 HCHG ROOM/CARE - OB PRIVATE (112)

## 2019-02-28 PROCEDURE — A9270 NON-COVERED ITEM OR SERVICE: HCPCS | Performed by: OBSTETRICS & GYNECOLOGY

## 2019-02-28 PROCEDURE — 700111 HCHG RX REV CODE 636 W/ 250 OVERRIDE (IP): Performed by: OBSTETRICS & GYNECOLOGY

## 2019-02-28 RX ORDER — PSEUDOEPHEDRINE HCL 30 MG
100 TABLET ORAL 2 TIMES DAILY PRN
Qty: 60 CAP | COMMUNITY
Start: 2019-02-28 | End: 2021-03-29

## 2019-02-28 RX ORDER — IBUPROFEN 600 MG/1
600 TABLET ORAL EVERY 6 HOURS PRN
Qty: 30 TAB | Refills: 1 | Status: SHIPPED | OUTPATIENT
Start: 2019-02-28 | End: 2021-03-29

## 2019-02-28 RX ADMIN — IBUPROFEN 600 MG: 600 TABLET ORAL at 17:08

## 2019-02-28 RX ADMIN — SIMETHICONE CHEW TAB 80 MG 80 MG: 80 TABLET ORAL at 17:08

## 2019-02-28 RX ADMIN — IBUPROFEN 600 MG: 600 TABLET ORAL at 06:48

## 2019-02-28 RX ADMIN — MAGNESIUM HYDROXIDE 30 ML: 400 SUSPENSION ORAL at 17:08

## 2019-02-28 RX ADMIN — ONDANSETRON 4 MG: 4 TABLET, ORALLY DISINTEGRATING ORAL at 13:04

## 2019-02-28 RX ADMIN — Medication 1 TABLET: at 06:48

## 2019-02-28 RX ADMIN — SIMETHICONE CHEW TAB 80 MG 80 MG: 80 TABLET ORAL at 02:38

## 2019-02-28 NOTE — PROGRESS NOTES
POD 3---      Afebrile, VS normal, UO adequate  + flatus, tolerating regular diet well, denies N/V    LAB:       2019 17:30 2019 05:02   WBC 15.4 (H) 13.3 (H)   Hemoglobin 13.4 10.7 (L)   Hematocrit 41.0 33.7 (L)   Platelet Count 239 178          PE:     Lungs clear to auscultation  Abdomen soft, flat, bowel sounds present and normal  Wound dressing in place, waterproof barrier intact  Calves nontender, Dhara sign negative bilaterally  Back:  No CVA tenderness     PLAN: Postop care, analgesia as needed, diet as tolerated,  activity as tolerated. Patient planning on discharge:  today .    Prescriptions:   PNV, ibuprofen 600 mg, pt. Declines opioid prescription.    Followup plans:   2019, for wound check and dressing removal.

## 2019-02-28 NOTE — DISCHARGE SUMMARY
DATE OF ADMISSION:  2019    DATE OF DISCHARGE:  2019    ADMISSION DIAGNOSES:  1.  A 35-0/7 week gestation.  2.  Active labor.  3.  Three previous  sections, plan repeat  section.  4.  Patient requests permanent sterilization.    DISCHARGE DIAGNOSES:  1.  A 35-0/7 week gestation.  2.  Active labor.  3.  Three previous  sections,   repeat  section performed  4.  Patient requests permanent sterilization.    PROCEDURES:  1.  Repeat low transverse  section.  2.  Bilateral salpingectomy.    COMPLICATIONS:  None.    BABY:  Male, 1-minute Apgar 8 and 5-minute Apgar 9.  Weight 3130 g.    CORD GASES:  Umbilical artery, pH 7.27, pCO2 57.5, base excess -3.  Umbilical   vein, pH 7.33, pCO2 50.2, base excess -1.    PRESCRIPTIONS:  1.  Ibuprofen 600 mg every 6 hours p.r.n. pain.  2.  Tylenol p.r.n. pain.  3.  Prenatal vitamins.    FOLLOWUP PLANS:  Patient is to see me in 8 days.    HOSPITAL COURSE:  This 38-year-old lady is now G4, P4.  She presented at 35   weeks gestation in active labor, 7 cm dilated, with intact membranes.  Repeat    and bilateral salpingectomy was accomplished in a timely fashion.    She is going home on postoperative day #3 in satisfactory condition.  She has   been afebrile.  She has had normal return of bowel function.  There is no   evidence of wound infection.  Her waterproof Mepilex Ag dressing is still in   place with waterproof barrier intact.  Hemoglobin and hematocrit were 10.7 and   33.7 postpartum.  She has not been requiring any opioids.      Pathology confirms that we did indeed excise both fallopian tubes.  The placenta weighed 723 g   and was consistent with partial placental abruption.       ____________________________________     MD MOODY JAQUEZ / MICHAEL    DD:  2019 06:02:35  DT:  2019 06:18:47    D#:  [id number]  Job#:  [id number]

## 2019-02-28 NOTE — CARE PLAN
Problem: Altered physiologic condition related to postoperative  delivery  Goal: Patient physiologically stable as evidenced by normal lochia, palpable uterine involution and vital signs within normal limits  Outcome: PROGRESSING AS EXPECTED  Fundus firm. Lochia light to scant . Vital signs WDL.     Problem: Alteration in comfort related to surgical incision and/or after birth pains  Goal: Patient is able to ambulate, care for self and infant with acceptable pain level  Outcome: PROGRESSING AS EXPECTED  Pt able to ambulate, care for self and infant. Pt states her pain is controlled with prn pain medication. Pt's pain reassessed throughout this shift.

## 2019-02-28 NOTE — PROGRESS NOTES
Assessment completed. WDL. Vital signs stable. Patient progressing according to plan of care. Patient up and ambulating. Pt states she is voiding without difficulty. Patient claims to have good pain relief with prn pain medications. Pt states she will call when she needs prn pain medication. Pt denies any other issues at this time. Educated pt about feeding infant every 3 hours or when infant is showing cues. Educated about pumping every 3 hours.  Pt encouraged to call for next feed to assess latch. Pt encouraged to call for any needs.

## 2019-02-28 NOTE — LACTATION NOTE
"This note was copied from a baby's chart.  Met with mother who will stay another day before discharge. Baby was born at 35 wks gestation. Right now baby is asleep, skin appears jaundiced and RN doing skin bili check. Discussed  protocol that baby is on and how our goals are to keep baby fed and increasing her milk supply so that when baby is able to nursed better she will have a good supply. She related that with her previous 3 babies she has never had a sufficient milk supply and always had to supplement them so she is treating breastfeeding as though she is giving baby \"dessert\". She still wants to try breastfeeding and will pump anyway. She seems very engaged in the process as we go over baby's plan.    She will offer her breast, then supplement with her milk and formula with amounts according to the white supplementation guide since baby's bili is in the range of being treated under bili lights. Total feed time will be no more than 30 min. Baby will require close follow up after discharge.  "

## 2019-02-28 NOTE — LACTATION NOTE
Follow-up, has been consistent with LPI feeding plan, and has rented HG pump. She is aware she can follow up with TLC after discharge.    Encouraged to call for support.

## 2019-03-01 VITALS
OXYGEN SATURATION: 97 % | RESPIRATION RATE: 16 BRPM | SYSTOLIC BLOOD PRESSURE: 117 MMHG | DIASTOLIC BLOOD PRESSURE: 79 MMHG | BODY MASS INDEX: 37.49 KG/M2 | TEMPERATURE: 98.7 F | HEART RATE: 83 BPM | WEIGHT: 225 LBS | HEIGHT: 65 IN

## 2019-03-01 PROCEDURE — A9270 NON-COVERED ITEM OR SERVICE: HCPCS | Performed by: OBSTETRICS & GYNECOLOGY

## 2019-03-01 PROCEDURE — 700102 HCHG RX REV CODE 250 W/ 637 OVERRIDE(OP): Performed by: OBSTETRICS & GYNECOLOGY

## 2019-03-01 RX ADMIN — SIMETHICONE CHEW TAB 80 MG 80 MG: 80 TABLET ORAL at 14:31

## 2019-03-01 RX ADMIN — Medication 1 TABLET: at 05:59

## 2019-03-01 RX ADMIN — IBUPROFEN 600 MG: 600 TABLET ORAL at 14:27

## 2019-03-01 RX ADMIN — SIMETHICONE CHEW TAB 80 MG 80 MG: 80 TABLET ORAL at 14:30

## 2019-03-01 NOTE — LACTATION NOTE
"This note was copied from a baby's chart.  Met with mother before discharge. Baby is back from the  Nursery. According to Mom,  Bili lights were stopped at 0500 and bili will be redrawn at noon today. Mom is still attempting to put baby to breast but states that he \"doesn't do much\" at breast yet. We discussed  behavior and our LPI plan from yesterday to keep baby fed and protect her milk supply by pumping. Mom has  rented a [place] pump for home and which is at her bedside. She states that she pumped 10mls at one pumping and 5 at another which she fed to the baby. She has [facility] insurance. Encouraged her to meet with an LC over the next few weeks as baby gets older and gains more oral feeding skills. Gave her other written OP lactation resources.    Plan:  Do a lot of skin to skin care with baby.  For now keep feedings to a total of 30 minutes or less.  Watch for baby's hunger cues.    1.)Offer the breast first. At this time, if baby isn't too interested don't spend a lot of time trying to latch because baby may get very tired out and struggle to take the rest of the feeding. As he gets older his feeding skills and stamina will improve.  2.) Supplement with your pumped milk first then formula in amounts on the supplementation guide. Ask your pediatrician how much baby needs to get after the 4th day. (Guide only goes up to day 4).  3.) You should pump and store your milk for the next feeding. You can find milk storage guidelines on [employer]      "

## 2019-03-01 NOTE — PROGRESS NOTES
Received report from Emily AGUAYO. Assumed care. Received on her room resting. Call light within reach.

## 2019-03-01 NOTE — DISCHARGE INSTRUCTIONS
POSTPARTUM DISCHARGE INSTRUCTIONS FOR MOM    YOB: 1980   Age: 38 y.o.               Admit Date: 2019     Discharge Date: 3/1/2019  Attending Doctor:  Aryan Westbrook M.D.                  Allergies:  Patient has no known allergies.    Discharged to home by car. Discharged via wheelchair, hospital escort: Yes.  Special equipment needed: Not Applicable  Belongings with: Personal  Be sure to schedule a follow-up appointment with your primary care doctor or any specialists as instructed.     Discharge Plan:   Diet Plan: Discussed  Activity Level: Discussed  Confirmed Follow up Appointment: Appointment Scheduled  Confirmed Symptoms Management: Discussed  Medication Reconciliation Updated: No (Comments)  Influenza Vaccine Given - only chart on this line when given: Influenza Vaccine Given (See MAR)    REASONS TO CALL YOUR OBSTETRICIAN:  1.   Persistent fever or shaking chills (Temperature higher than 100.4)  2.   Heavy bleeding (soaking more than 1 pad per hour); Passing clots  3.   Foul odor from vagina  4.   Mastitis (Breast infection; breast pain, chills, fever, redness)  5.   Urinary pain, burning or frequency  6.   Episiotomy infection  7.   Abdominal incision infection  8.   Severe depression longer than 24 hours    HAND WASHING  · Prior to handling the baby.  · Before breastfeeding or bottle feeding baby.  · After using the bathroom or changing the baby's diaper.    WOUND CARE  Ask your physician for additional care instructions.  In general:    ·  Incision:      · Keep clean and dry.    · Do NOT lift anything heavier than your baby for up to 6 weeks.    · There should not be any opening or pus.      VAGINAL CARE  · Nothing inside vagina for 6 weeks: no sexual intercourse, tampons or douching.  · Bleeding may continue for 2-4 weeks.  Amount may vary.    · Call your physician for heavy bleeding which means soaking more than 1 pad per hour    BIRTH CONTROL  · It is possible to become  "pregnant at any time after delivery and while breastfeeding.  · Plan to discuss a method of birth control with your physician at your follow up visit. visit.    DIET AND ELIMINATION  · Eating more fiber (bran cereal, fruits, and vegetables) and drinking plenty of fluids will help to avoid constipation.  · Urinary frequency after childbirth is normal.    POSTPARTUM BLUES  During the first few days after birth, you may experience a sense of the \"blues\" which may include impatience, irritability or even crying.  These feeling come and go quickly.  However, as many as 1 in 10 women experience emotional symptoms known as postpartum depression.    Postpartum depression:  May start as early as the second or third day after delivery or take several weeks or months to develop.  Symptoms of \"blues\" are present, but are more intense:  Crying spells; loss of appetite; feelings of hopelessness or loss of control; fear of touching the baby; over concern or no concern at all about the baby; little or no concern about your own appearance/caring for yourself; and/or inability to sleep or excessive sleeping.  Contact your physician if you are experiencing any of these symptoms.    Crisis Hotline:  · [place] Crisis Hotline:  7-054-FNYSTMO  Or 1-972.396.2897  · [place] Crisis Hotline:  1-879.689.5410  Or 152-397-0981    PREVENTING SHAKEN BABY:  If you are angry or stressed, PUT THE BABY IN THE CRIB, step away, take some deep breaths, and wait until you are calm to care for the baby.  DO NOT SHAKE THE BABY.  You are not alone, call a supporter for help.    · Crisis Call Center 24/7 crisis line 680-138-5618 or 1-239.391.1147  · You can also text them, text \"ANSWER\" to 791970    QUIT SMOKING/TOBACCO USE:  I understand the use of any tobacco products increases my chance of suffering from future heart disease and could cause other illnesses which may shorten my life. Quitting the use of tobacco products is the single most important thing I " can do to improve my health. For further information on smoking / tobacco cessation call a Toll Free Quit Line at 1-942.236.9981 (*[employer]) or 1-763.627.2037 (American Lung Association) or you can access the web based program at www.lungusa.org.    · [place] Tobacco Users Help Line:  (880) 502-8330       Toll Free: 1-270.943.2604  · Quit Tobacco Program [facility] (969)269-6992    DEPRESSION / SUICIDE RISK:  As [facility] are [facility], it is important to learn how to keep safe from harming yourself.    Recognize the warning signs:  · Abrupt changes in personality, positive or negative- including increase in energy   · Giving away possessions  · Change in eating patterns- significant weight changes-  positive or negative  · Change in sleeping patterns- unable to sleep or sleeping all the time   · Unwillingness or inability to communicate  · Depression  · Unusual sadness, discouragement and loneliness  · Talk of wanting to die  · Neglect of personal appearance   · Rebelliousness- reckless behavior  · Withdrawal from people/activities they love  · Confusion- inability to concentrate     If you or a loved one observes any of these behaviors or has concerns about self-harm, here's what you can do:  · Talk about it- your feelings and reasons for harming yourself  · Remove any means that you might use to hurt yourself (examples: pills, rope, extension cords, firearm)  · Get professional help from the community (Mental Health, Substance Abuse, psychological counseling)  · Do not be alone:Call your Safe Contact- someone whom you trust who will be there for you.  · Call your local CRISIS HOTLINE 504-8579 or 322-305-5684  · Call your local Children's Mobile Crisis Response Team [place] (002) 560-9182 or www.Bevii  · Call the toll free National Suicide Prevention Hotlines   · National Suicide Prevention Lifeline 451-060-DFYP (9634)  · National  [facility] 800-SUICIDE (532-4983)    DISCHARGE SURVEY:  Thank you for choosing [facility].  We hope we provided you with very good care.  You may be receiving a survey in the mail.  Please fill it out.  Your opinion is valuable to us.    ADDITIONAL EDUCATIONAL MATERIALS GIVEN TO PATIENT:        My signature on this form indicates that:  1.  I have reviewed and understand the above information  2.  My questions regarding this information have been answered to my satisfaction.  3.  I have formulated a plan with my discharge nurse to obtain my prescribed medication for home.

## 2019-03-01 NOTE — PROGRESS NOTES
New Mom received on her room awake, with surgical incision covered with mepilex silver CDI. Fundus firm -2 below the  umbilicus with light lochia discharge. Breastfeeding her NB well. Pain well controlled with Motrin. Needs attended. Call light within reach. Hourly rounding practiced.

## 2019-03-01 NOTE — PROGRESS NOTES
POD #4    S:  Pt stayed an extra day as baby had to go to nursery for elevated bilirubin levels.  Pt will go home today.    O: T 98.2 P 90  /83  ABD: Obese, ND, minimally tender, BS + normoactive, FF below umb, Mepilex Ag dressing dry  EXT:+1 pedal edema, no cords or Homans    A/P:  POD #4 S/P repeat  C/S and BS at 35 weeks, PTL - doing well  1. D/C home  2.  F/U Dr. Westbrook  as matt   3.  Declines Rx for narcotics

## 2019-03-01 NOTE — CARE PLAN
Problem: Altered physiologic condition related to postoperative  delivery  Goal: Patient physiologically stable as evidenced by normal lochia, palpable uterine involution and vital signs within normal limits  Fundus firm -2 below the umbilicus with light lochia discharge.    Problem: Potential for postpartum infection related to surgical incision, compromised uterine condition, urinary tract or respiratory compromise  Goal: Patient will be afebrile and free from signs and symptoms of infection  Intervention: Get patient out of bed and ambulating as ordered by MD/DO/CNM  Pt walking on the unit without any discomfort. Surgical incision with Mepilex silver CDI, no s/s of infection noted.      Problem: Alteration in comfort related to surgical incision and/or after birth pains  Goal: Patient verbalizes acceptable pain level  Pain well controlled with Motrin.

## 2019-03-08 ENCOUNTER — HOSPITAL ENCOUNTER (OUTPATIENT)
Facility: MEDICAL CENTER | Age: 39
End: 2019-03-08
Attending: OBSTETRICS & GYNECOLOGY | Admitting: OBSTETRICS & GYNECOLOGY
Payer: COMMERCIAL

## 2019-07-29 ENCOUNTER — HOSPITAL ENCOUNTER (OUTPATIENT)
Dept: LAB | Facility: MEDICAL CENTER | Age: 39
End: 2019-07-29
Attending: OBSTETRICS & GYNECOLOGY
Payer: COMMERCIAL

## 2019-07-29 LAB — CYTOLOGY REG CYTOL: NORMAL

## 2019-07-29 PROCEDURE — 88175 CYTOPATH C/V AUTO FLUID REDO: CPT

## 2019-12-21 ENCOUNTER — OFFICE VISIT (OUTPATIENT)
Dept: URGENT CARE | Facility: CLINIC | Age: 39
End: 2019-12-21
Payer: COMMERCIAL

## 2019-12-21 VITALS
OXYGEN SATURATION: 97 % | WEIGHT: 215 LBS | HEIGHT: 65 IN | SYSTOLIC BLOOD PRESSURE: 112 MMHG | BODY MASS INDEX: 35.82 KG/M2 | DIASTOLIC BLOOD PRESSURE: 72 MMHG | RESPIRATION RATE: 12 BRPM | TEMPERATURE: 97.5 F | HEART RATE: 78 BPM

## 2019-12-21 DIAGNOSIS — J02.9 ACUTE PHARYNGITIS, UNSPECIFIED ETIOLOGY: ICD-10-CM

## 2019-12-21 DIAGNOSIS — Z20.818 STREP THROAT EXPOSURE: ICD-10-CM

## 2019-12-21 LAB
INT CON NEG: NORMAL
INT CON POS: NORMAL
S PYO AG THROAT QL: NEGATIVE

## 2019-12-21 PROCEDURE — 87880 STREP A ASSAY W/OPTIC: CPT | Performed by: FAMILY MEDICINE

## 2019-12-21 PROCEDURE — 99214 OFFICE O/P EST MOD 30 MIN: CPT | Performed by: FAMILY MEDICINE

## 2019-12-21 RX ORDER — PENICILLIN V POTASSIUM 500 MG/1
500 TABLET ORAL 2 TIMES DAILY
Qty: 20 TAB | Refills: 0 | Status: SHIPPED | OUTPATIENT
Start: 2019-12-21 | End: 2019-12-31

## 2019-12-21 ASSESSMENT — ENCOUNTER SYMPTOMS
EYE REDNESS: 0
WEIGHT LOSS: 0
MYALGIAS: 0
VOMITING: 0
NAUSEA: 0
EYE DISCHARGE: 0

## 2019-12-21 NOTE — PROGRESS NOTES
"Subjective:      Adrianne Yanez is a 39 y.o. female who presents with Sore Throat (x 2 weeks.  Pt. complains of sore throat, R ear pressure and congestion.  Denies any fever or chills. )            2 weeks sore throat.  Concerned about strep throat/exposure as . No fever.  Tolerating fluids with normal urine output.  No cough.  Associated nasal congestion and PND. Mild cough due to PND.  Minimal relief with OTC medications of moderate and constant symptoms.  No other aggravating or alleviating factors.      Review of Systems   Constitutional: Negative for malaise/fatigue and weight loss.   HENT: Positive for ear pain. Negative for ear discharge.    Eyes: Negative for discharge and redness.   Gastrointestinal: Negative for nausea and vomiting.   Musculoskeletal: Negative for joint pain and myalgias.   Skin: Negative for itching and rash.     .  Medications, Allergies, and current problem list reviewed today in Epic       Objective:     /72   Pulse 78   Temp 36.4 °C (97.5 °F) (Temporal)   Resp 12   Ht 1.651 m (5' 5\")   Wt 97.5 kg (215 lb)   LMP 12/16/2019   SpO2 97%   BMI 35.78 kg/m²      Physical Exam  Constitutional:       General: She is not in acute distress.     Appearance: She is well-developed.   HENT:      Head: Normocephalic and atraumatic.      Mouth/Throat:      Pharynx: Posterior oropharyngeal erythema present.   Eyes:      Conjunctiva/sclera: Conjunctivae normal.   Neck:      Musculoskeletal: Neck supple.   Cardiovascular:      Rate and Rhythm: Normal rate and regular rhythm.      Heart sounds: Normal heart sounds. No murmur.   Pulmonary:      Effort: Pulmonary effort is normal.      Breath sounds: Normal breath sounds. No wheezing.   Lymphadenopathy:      Cervical: No cervical adenopathy.   Skin:     General: Skin is warm and dry.      Findings: No rash.   Neurological:      Mental Status: She is alert and oriented to person, place, and time.               "   Assessment/Plan:     Rapid strep negative    1. Acute pharyngitis, unspecified etiology  POCT Rapid Strep A    penicillin v potassium (VEETID) 500 MG Tab   2. Strep throat exposure  penicillin v potassium (VEETID) 500 MG Tab     Differential diagnosis, natural history, supportive care, and indications for immediate follow-up discussed at length.     Given duration and exposure will rx contingent abx.

## 2020-05-30 ENCOUNTER — HOSPITAL ENCOUNTER (OUTPATIENT)
Dept: LAB | Facility: MEDICAL CENTER | Age: 40
End: 2020-05-30
Attending: FAMILY MEDICINE
Payer: COMMERCIAL

## 2020-05-30 LAB
ALBUMIN SERPL BCP-MCNC: 4.3 G/DL (ref 3.2–4.9)
ALBUMIN/GLOB SERPL: 1.5 G/DL
ALP SERPL-CCNC: 80 U/L (ref 30–99)
ALT SERPL-CCNC: 71 U/L (ref 2–50)
AMYLASE SERPL-CCNC: 49 U/L (ref 20–103)
ANION GAP SERPL CALC-SCNC: 13 MMOL/L (ref 7–16)
AST SERPL-CCNC: 56 U/L (ref 12–45)
BASOPHILS # BLD AUTO: 0.8 % (ref 0–1.8)
BASOPHILS # BLD: 0.07 K/UL (ref 0–0.12)
BILIRUB SERPL-MCNC: 0.9 MG/DL (ref 0.1–1.5)
BUN SERPL-MCNC: 12 MG/DL (ref 8–22)
CALCIUM SERPL-MCNC: 9.1 MG/DL (ref 8.5–10.5)
CHLORIDE SERPL-SCNC: 101 MMOL/L (ref 96–112)
CHOLEST SERPL-MCNC: 239 MG/DL (ref 100–199)
CO2 SERPL-SCNC: 21 MMOL/L (ref 20–33)
CREAT SERPL-MCNC: 0.47 MG/DL (ref 0.5–1.4)
EOSINOPHIL # BLD AUTO: 0.37 K/UL (ref 0–0.51)
EOSINOPHIL NFR BLD: 4.1 % (ref 0–6.9)
ERYTHROCYTE [DISTWIDTH] IN BLOOD BY AUTOMATED COUNT: 43.5 FL (ref 35.9–50)
FASTING STATUS PATIENT QL REPORTED: NORMAL
GLOBULIN SER CALC-MCNC: 2.9 G/DL (ref 1.9–3.5)
GLUCOSE SERPL-MCNC: 83 MG/DL (ref 65–99)
HCT VFR BLD AUTO: 41.1 % (ref 37–47)
HDLC SERPL-MCNC: 42 MG/DL
HGB BLD-MCNC: 12.5 G/DL (ref 12–16)
IMM GRANULOCYTES # BLD AUTO: 0.02 K/UL (ref 0–0.11)
IMM GRANULOCYTES NFR BLD AUTO: 0.2 % (ref 0–0.9)
LDLC SERPL CALC-MCNC: 171 MG/DL
LIPASE SERPL-CCNC: 28 U/L (ref 11–82)
LYMPHOCYTES # BLD AUTO: 3.56 K/UL (ref 1–4.8)
LYMPHOCYTES NFR BLD: 39.3 % (ref 22–41)
MCH RBC QN AUTO: 25.3 PG (ref 27–33)
MCHC RBC AUTO-ENTMCNC: 30.4 G/DL (ref 33.6–35)
MCV RBC AUTO: 83.2 FL (ref 81.4–97.8)
MONOCYTES # BLD AUTO: 0.42 K/UL (ref 0–0.85)
MONOCYTES NFR BLD AUTO: 4.6 % (ref 0–13.4)
NEUTROPHILS # BLD AUTO: 4.61 K/UL (ref 2–7.15)
NEUTROPHILS NFR BLD: 51 % (ref 44–72)
NRBC # BLD AUTO: 0 K/UL
NRBC BLD-RTO: 0 /100 WBC
PLATELET # BLD AUTO: 273 K/UL (ref 164–446)
PMV BLD AUTO: 12.8 FL (ref 9–12.9)
POTASSIUM SERPL-SCNC: 4 MMOL/L (ref 3.6–5.5)
PROT SERPL-MCNC: 7.2 G/DL (ref 6–8.2)
RBC # BLD AUTO: 4.94 M/UL (ref 4.2–5.4)
SODIUM SERPL-SCNC: 135 MMOL/L (ref 135–145)
T3FREE SERPL-MCNC: 3.29 PG/ML (ref 2–4.4)
T4 FREE SERPL-MCNC: 1.09 NG/DL (ref 0.93–1.7)
TRIGL SERPL-MCNC: 130 MG/DL (ref 0–149)
TSH SERPL DL<=0.005 MIU/L-ACNC: 2.48 UIU/ML (ref 0.38–5.33)
WBC # BLD AUTO: 9.1 K/UL (ref 4.8–10.8)

## 2020-05-30 PROCEDURE — 84481 FREE ASSAY (FT-3): CPT

## 2020-05-30 PROCEDURE — 84439 ASSAY OF FREE THYROXINE: CPT

## 2020-05-30 PROCEDURE — 80053 COMPREHEN METABOLIC PANEL: CPT

## 2020-05-30 PROCEDURE — 82150 ASSAY OF AMYLASE: CPT

## 2020-05-30 PROCEDURE — 36415 COLL VENOUS BLD VENIPUNCTURE: CPT

## 2020-05-30 PROCEDURE — 83690 ASSAY OF LIPASE: CPT

## 2020-05-30 PROCEDURE — 85025 COMPLETE CBC W/AUTO DIFF WBC: CPT

## 2020-05-30 PROCEDURE — 84443 ASSAY THYROID STIM HORMONE: CPT

## 2020-05-30 PROCEDURE — 80061 LIPID PANEL: CPT

## 2020-09-23 ENCOUNTER — HOSPITAL ENCOUNTER (OUTPATIENT)
Dept: LAB | Facility: MEDICAL CENTER | Age: 40
End: 2020-09-23
Attending: PHYSICIAN ASSISTANT
Payer: COMMERCIAL

## 2020-09-23 LAB — CYTOLOGY REG CYTOL: NORMAL

## 2020-09-23 PROCEDURE — 88175 CYTOPATH C/V AUTO FLUID REDO: CPT

## 2020-10-31 ENCOUNTER — HOSPITAL ENCOUNTER (OUTPATIENT)
Dept: LAB | Facility: MEDICAL CENTER | Age: 40
End: 2020-10-31
Attending: FAMILY MEDICINE
Payer: COMMERCIAL

## 2020-10-31 LAB
25(OH)D3 SERPL-MCNC: 58 NG/ML (ref 30–100)
ALBUMIN SERPL BCP-MCNC: 4.6 G/DL (ref 3.2–4.9)
ALBUMIN/GLOB SERPL: 1.4 G/DL
ALP SERPL-CCNC: 87 U/L (ref 30–99)
ALT SERPL-CCNC: 33 U/L (ref 2–50)
ANION GAP SERPL CALC-SCNC: 13 MMOL/L (ref 7–16)
AST SERPL-CCNC: 31 U/L (ref 12–45)
BASOPHILS # BLD AUTO: 0.5 % (ref 0–1.8)
BASOPHILS # BLD: 0.05 K/UL (ref 0–0.12)
BILIRUB SERPL-MCNC: 1.1 MG/DL (ref 0.1–1.5)
BUN SERPL-MCNC: 12 MG/DL (ref 8–22)
CALCIUM SERPL-MCNC: 9.8 MG/DL (ref 8.5–10.5)
CHLORIDE SERPL-SCNC: 101 MMOL/L (ref 96–112)
CHOLEST SERPL-MCNC: 227 MG/DL (ref 100–199)
CO2 SERPL-SCNC: 24 MMOL/L (ref 20–33)
CREAT SERPL-MCNC: 0.53 MG/DL (ref 0.5–1.4)
EOSINOPHIL # BLD AUTO: 0.31 K/UL (ref 0–0.51)
EOSINOPHIL NFR BLD: 3.3 % (ref 0–6.9)
ERYTHROCYTE [DISTWIDTH] IN BLOOD BY AUTOMATED COUNT: 43.4 FL (ref 35.9–50)
FASTING STATUS PATIENT QL REPORTED: NORMAL
GLOBULIN SER CALC-MCNC: 3.2 G/DL (ref 1.9–3.5)
GLUCOSE SERPL-MCNC: 78 MG/DL (ref 65–99)
HCT VFR BLD AUTO: 43 % (ref 37–47)
HDLC SERPL-MCNC: 47 MG/DL
HGB BLD-MCNC: 13.3 G/DL (ref 12–16)
IMM GRANULOCYTES # BLD AUTO: 0.02 K/UL (ref 0–0.11)
IMM GRANULOCYTES NFR BLD AUTO: 0.2 % (ref 0–0.9)
LDLC SERPL CALC-MCNC: 159 MG/DL
LYMPHOCYTES # BLD AUTO: 3.52 K/UL (ref 1–4.8)
LYMPHOCYTES NFR BLD: 37.2 % (ref 22–41)
MCH RBC QN AUTO: 25.1 PG (ref 27–33)
MCHC RBC AUTO-ENTMCNC: 30.9 G/DL (ref 33.6–35)
MCV RBC AUTO: 81.3 FL (ref 81.4–97.8)
MONOCYTES # BLD AUTO: 0.48 K/UL (ref 0–0.85)
MONOCYTES NFR BLD AUTO: 5.1 % (ref 0–13.4)
NEUTROPHILS # BLD AUTO: 5.09 K/UL (ref 2–7.15)
NEUTROPHILS NFR BLD: 53.7 % (ref 44–72)
NRBC # BLD AUTO: 0 K/UL
NRBC BLD-RTO: 0 /100 WBC
PLATELET # BLD AUTO: 269 K/UL (ref 164–446)
PMV BLD AUTO: 13.4 FL (ref 9–12.9)
POTASSIUM SERPL-SCNC: 4.5 MMOL/L (ref 3.6–5.5)
PROT SERPL-MCNC: 7.8 G/DL (ref 6–8.2)
RBC # BLD AUTO: 5.29 M/UL (ref 4.2–5.4)
SODIUM SERPL-SCNC: 138 MMOL/L (ref 135–145)
T3FREE SERPL-MCNC: 3.23 PG/ML (ref 2–4.4)
T4 FREE SERPL-MCNC: 1.06 NG/DL (ref 0.93–1.7)
TRIGL SERPL-MCNC: 104 MG/DL (ref 0–149)
TSH SERPL DL<=0.005 MIU/L-ACNC: 1.1 UIU/ML (ref 0.38–5.33)
WBC # BLD AUTO: 9.5 K/UL (ref 4.8–10.8)

## 2020-10-31 PROCEDURE — 82306 VITAMIN D 25 HYDROXY: CPT

## 2020-10-31 PROCEDURE — 85025 COMPLETE CBC W/AUTO DIFF WBC: CPT

## 2020-10-31 PROCEDURE — 80053 COMPREHEN METABOLIC PANEL: CPT

## 2020-10-31 PROCEDURE — 36415 COLL VENOUS BLD VENIPUNCTURE: CPT

## 2020-10-31 PROCEDURE — 86800 THYROGLOBULIN ANTIBODY: CPT

## 2020-10-31 PROCEDURE — 84481 FREE ASSAY (FT-3): CPT

## 2020-10-31 PROCEDURE — 84439 ASSAY OF FREE THYROXINE: CPT

## 2020-10-31 PROCEDURE — 84443 ASSAY THYROID STIM HORMONE: CPT

## 2020-10-31 PROCEDURE — 80061 LIPID PANEL: CPT

## 2020-11-03 LAB — THYROGLOB AB SERPL-ACNC: <0.9 IU/ML (ref 0–4)

## 2021-02-20 ENCOUNTER — HOSPITAL ENCOUNTER (OUTPATIENT)
Dept: LAB | Facility: MEDICAL CENTER | Age: 41
End: 2021-02-20
Attending: FAMILY MEDICINE
Payer: COMMERCIAL

## 2021-02-20 LAB
25(OH)D3 SERPL-MCNC: 62 NG/ML (ref 30–100)
ALBUMIN SERPL BCP-MCNC: 4.1 G/DL (ref 3.2–4.9)
ALBUMIN/GLOB SERPL: 1.3 G/DL
ALP SERPL-CCNC: 86 U/L (ref 30–99)
ALT SERPL-CCNC: 40 U/L (ref 2–50)
ANION GAP SERPL CALC-SCNC: 10 MMOL/L (ref 7–16)
AST SERPL-CCNC: 37 U/L (ref 12–45)
BASOPHILS # BLD AUTO: 0.6 % (ref 0–1.8)
BASOPHILS # BLD: 0.06 K/UL (ref 0–0.12)
BILIRUB SERPL-MCNC: 1.1 MG/DL (ref 0.1–1.5)
BUN SERPL-MCNC: 12 MG/DL (ref 8–22)
CALCIUM SERPL-MCNC: 9.7 MG/DL (ref 8.5–10.5)
CHLORIDE SERPL-SCNC: 103 MMOL/L (ref 96–112)
CHOLEST SERPL-MCNC: 207 MG/DL (ref 100–199)
CO2 SERPL-SCNC: 23 MMOL/L (ref 20–33)
CREAT SERPL-MCNC: 0.45 MG/DL (ref 0.5–1.4)
EOSINOPHIL # BLD AUTO: 0.33 K/UL (ref 0–0.51)
EOSINOPHIL NFR BLD: 3.5 % (ref 0–6.9)
ERYTHROCYTE [DISTWIDTH] IN BLOOD BY AUTOMATED COUNT: 42.5 FL (ref 35.9–50)
FASTING STATUS PATIENT QL REPORTED: NORMAL
GLOBULIN SER CALC-MCNC: 3.1 G/DL (ref 1.9–3.5)
GLUCOSE SERPL-MCNC: 79 MG/DL (ref 65–99)
HCT VFR BLD AUTO: 38.9 % (ref 37–47)
HDLC SERPL-MCNC: 42 MG/DL
HGB BLD-MCNC: 11.8 G/DL (ref 12–16)
IMM GRANULOCYTES # BLD AUTO: 0.03 K/UL (ref 0–0.11)
IMM GRANULOCYTES NFR BLD AUTO: 0.3 % (ref 0–0.9)
LDLC SERPL CALC-MCNC: 141 MG/DL
LYMPHOCYTES # BLD AUTO: 3.27 K/UL (ref 1–4.8)
LYMPHOCYTES NFR BLD: 34.7 % (ref 22–41)
MCH RBC QN AUTO: 24 PG (ref 27–33)
MCHC RBC AUTO-ENTMCNC: 30.3 G/DL (ref 33.6–35)
MCV RBC AUTO: 79.2 FL (ref 81.4–97.8)
MONOCYTES # BLD AUTO: 0.5 K/UL (ref 0–0.85)
MONOCYTES NFR BLD AUTO: 5.3 % (ref 0–13.4)
NEUTROPHILS # BLD AUTO: 5.23 K/UL (ref 2–7.15)
NEUTROPHILS NFR BLD: 55.6 % (ref 44–72)
NRBC # BLD AUTO: 0 K/UL
NRBC BLD-RTO: 0 /100 WBC
PLATELET # BLD AUTO: 286 K/UL (ref 164–446)
PMV BLD AUTO: 12.9 FL (ref 9–12.9)
POTASSIUM SERPL-SCNC: 4.1 MMOL/L (ref 3.6–5.5)
PROT SERPL-MCNC: 7.2 G/DL (ref 6–8.2)
RBC # BLD AUTO: 4.91 M/UL (ref 4.2–5.4)
SODIUM SERPL-SCNC: 136 MMOL/L (ref 135–145)
T3FREE SERPL-MCNC: 3.58 PG/ML (ref 2–4.4)
T4 FREE SERPL-MCNC: 1.11 NG/DL (ref 0.93–1.7)
TRIGL SERPL-MCNC: 122 MG/DL (ref 0–149)
TSH SERPL DL<=0.005 MIU/L-ACNC: 1.12 UIU/ML (ref 0.38–5.33)
WBC # BLD AUTO: 9.4 K/UL (ref 4.8–10.8)

## 2021-02-20 PROCEDURE — 80061 LIPID PANEL: CPT

## 2021-02-20 PROCEDURE — 85025 COMPLETE CBC W/AUTO DIFF WBC: CPT

## 2021-02-20 PROCEDURE — 84481 FREE ASSAY (FT-3): CPT

## 2021-02-20 PROCEDURE — 80053 COMPREHEN METABOLIC PANEL: CPT

## 2021-02-20 PROCEDURE — 36415 COLL VENOUS BLD VENIPUNCTURE: CPT

## 2021-02-20 PROCEDURE — 86800 THYROGLOBULIN ANTIBODY: CPT

## 2021-02-20 PROCEDURE — 84439 ASSAY OF FREE THYROXINE: CPT

## 2021-02-20 PROCEDURE — 84443 ASSAY THYROID STIM HORMONE: CPT

## 2021-02-20 PROCEDURE — 82306 VITAMIN D 25 HYDROXY: CPT

## 2021-02-23 LAB — THYROGLOB AB SERPL-ACNC: <0.9 IU/ML (ref 0–4)

## 2021-03-29 ENCOUNTER — OFFICE VISIT (OUTPATIENT)
Dept: CARDIOLOGY | Facility: MEDICAL CENTER | Age: 41
End: 2021-03-29
Payer: COMMERCIAL

## 2021-03-29 VITALS
HEART RATE: 83 BPM | OXYGEN SATURATION: 97 % | HEIGHT: 65 IN | SYSTOLIC BLOOD PRESSURE: 110 MMHG | BODY MASS INDEX: 32.69 KG/M2 | DIASTOLIC BLOOD PRESSURE: 68 MMHG | WEIGHT: 196.21 LBS

## 2021-03-29 DIAGNOSIS — E66.9 OBESITY (BMI 30-39.9): Chronic | ICD-10-CM

## 2021-03-29 DIAGNOSIS — E78.5 DYSLIPIDEMIA: Chronic | ICD-10-CM

## 2021-03-29 DIAGNOSIS — R07.2 PRECORDIAL CHEST PAIN: ICD-10-CM

## 2021-03-29 PROCEDURE — 99203 OFFICE O/P NEW LOW 30 MIN: CPT | Performed by: INTERNAL MEDICINE

## 2021-03-29 RX ORDER — LEVOTHYROXINE SODIUM 0.05 MG/1
50 TABLET ORAL
COMMUNITY
Start: 2021-02-13

## 2021-03-29 RX ORDER — FERROUS GLUCONATE 324(38)MG
324 TABLET ORAL
COMMUNITY

## 2021-03-29 ASSESSMENT — ENCOUNTER SYMPTOMS
SHORTNESS OF BREATH: 0
PND: 0
SORE THROAT: 0
NAUSEA: 0
CLAUDICATION: 0
ABDOMINAL PAIN: 0
BRUISES/BLEEDS EASILY: 0
FALLS: 0
BLURRED VISION: 0
FOCAL WEAKNESS: 0
FEVER: 0
WEAKNESS: 0
COUGH: 0
PALPITATIONS: 0
DIZZINESS: 0
CHILLS: 0

## 2021-03-29 ASSESSMENT — FIBROSIS 4 INDEX: FIB4 SCORE: 0.78

## 2021-03-29 NOTE — PATIENT INSTRUCTIONS
Work on at least 1.5 - 5 hours a week of moderate exercise (typical brisk walking or similar activity)    If you have had a heart attack, stent, bypass or reduced heart strength (EF <35%): cardiac rehab may reduce your risk of dying by 13-24% and need to go to the hospital by 30% within the first year (1)    Please look into the following diets and incorporate them into your diet    LOW SALT DIET   KEEP YOUR SODIUM EQUAL TO CALORIES AND NO MORE THAN DOUBLE THE CALORIES FOR A LOW SALT DIET    Cardiosmart.org - great resource for American College of Cardiology on heart disease prevention and treatment    FOR TREATMENT OR PREVENTION OF CORONARY ARTERY DISEASE  These three programs are approved by Medicare/Insurers for those with heart disease  [facility] - [facility]  Dr. Leon's [facility] for Reversing Heart Disease - Rao Walton's Cardiologist vegetarian-based  [facility] - [place]-based mind-body Program    This is a commonly referenced Program  Dr Díaz - Carlos Eduardo over [place] (book and documentary) - vegetarian-based    FOR TREATMENT OF BLOOD PRESSURE  DASH DIET - American Heart Association for treatment of HYPERTENSION    FOR TREATMENT OF BAD CHOLESTEROL/FATS  REDUCE PROCESSED SUGAR AS MUCH AS POSSIBLE  INCREASE WHOLE GRAINS/VEGETABLES  INCREASE FIBER    Lowering total cholesterol and LDL (bad) cholesterol:  - Eat leaner cuts of meat, or eliminate altogether if possible red meat, and frequently substitute fish or chicken.  - Limit saturated fat to no more than 7-10% of total calories no more than 10 g per day is recommended. Some sources of saturated fat include butter, animal fats, hydrogenated vegetable fats and oils, many desserts, whole milk dairy products.  - Replaced saturated fats with polyunsaturated fats and monounsaturated fats. Foods high in monounsaturated fat include nuts, canola oil, avocados, and olives.  - Limit trans fat (processed foods)  and replaced with fresh fruits and vegetables  - Recommend nonfat dairy products  - Increase substantially the amount of soluble fiber intake (legumes such as beans, fruit, whole grains).  - Consider nutritional supplements: plant sterile spreads such as Benecol, fish oil,  flaxseed oil, omega-3 acids capsules 1000 mg twice a day, or viscous fiber such as Metamucil  - Attain ideal weight and regular exercise (at least 30 minutes per day of moderate exercise)  ASK ABOUT STATIN OR NON STATIN MEDICATION TO REDUCE YOUR LDL AND HEART RISK    Lowering triglycerides:  - Reduce intake of simple sugar: Desserts, candy, pastries, honey, sodas, sugared cereals, yogurt, Gatorade, sports bars, canned fruit, smoothies, fruit juice, coffee drinks  - Reduced intake of refined starches: Refined Pasta, most bread  - Reduce or abstain from alcohol  - Increase omega-3 fatty acids: [place], Trout, Mackerel, [place], [place] tuna and supplements  - Attain ideal weight and regular exercise (at least 30 minutes per day of moderate exercise)  ASK ABOUT PURIFIED OMEGA 3 EPA or FISH OIL TO REDUCE YOUR TG AND HEART RISK    Elevating HDL (good) cholesterol:  - Increase physical activity  - Increase omega-3 fatty acids and supplements as listed above  - Incorporating appropriate amounts of monounsaturated fats such as nuts, olive oil, canola oil, avocados, olives  - Stop smoking  - Attain ideal weight and regular exercise (at least 30 minutes per day of moderate exercise)

## 2021-03-30 NOTE — PROGRESS NOTES
Chief Complaint   Patient presents with   • Chest Pain       Subjective:   Adrianne Yanez is a 40 y.o. female who presents today in consultation from Anup Smith for evaluation of her history of chest pains.  Remotely in 2009 she had a cardiac evaluation for recurrent syncope with normal stress echo.  With her first pregnancy she had help with low platelets and did well with her subsequent 3 pregnancies.  She has a family history of heart failure for her father but does not know specifics may have had a valve issue.  She has been having on and off chest pains she was not sure if this was related to the increased stress at work and at home but has been working with her family heart healthy lifestyle and has lost weight she is also walking a lot more at work is a  with the social [employer]    Past Medical History:   Diagnosis Date   • Dyslipidemia    • Fall 2017    syncope after last delivery on toliet      Past Surgical History:   Procedure Laterality Date   • REPEAT C SECTION W TUBAL LIGATION  2/25/2019    Procedure: REPEAT C SECTION WITH TUBAL LIGATION;  Surgeon: Aryan Westbrook M.D.;  Location: LABOR AND DELIVERY;  Service: Labor and Delivery   • REPEAT C SECTION  2/4/2017    Procedure: REPEAT C SECTION;  Surgeon: Nadeen Martin M.D.;  Location: LABOR AND DELIVERY;  Service:    • REPEAT C SECTION  1/7/2015    Performed by Loni Guzman M.D. at LABOR AND DELIVERY   • PRIMARY C SECTION  6/12/2013    Performed by Aryan Westbrook M.D. at LABOR AND DELIVERY     Family History   Problem Relation Age of Onset   • Heart Disease Father         CHF   • Diabetes Brother    • Diabetes Brother      Social History     Socioeconomic History   • Marital status:      Spouse name: Not on file   • Number of children: Not on file   • Years of education: Not on file   • Highest education level: Not on file   Occupational History   • Not on file   Tobacco Use   • Smoking status: Never Smoker   •  Smokeless tobacco: Never Used   Substance and Sexual Activity   • Alcohol use: No     Alcohol/week: 0.0 oz   • Drug use: No   • Sexual activity: Not on file   Other Topics Concern   • Not on file   Social History Narrative   • Not on file     Social Determinants of Health     Financial Resource Strain:    • Difficulty of Paying Living Expenses:    Food Insecurity:    • Worried About Running Out of Food in the Last Year:    • Ran Out of Food in the Last Year:    Transportation Needs:    • Lack of Transportation (Medical):    • Lack of Transportation (Non-Medical):    Physical Activity:    • Days of Exercise per Week:    • Minutes of Exercise per Session:    Stress:    • Feeling of Stress :    Social Connections:    • Frequency of Communication with Friends and Family:    • Frequency of Social Gatherings with Friends and Family:    • Attends Denominational Services:    • Active Member of Clubs or Organizations:    • Attends Club or Organization Meetings:    • Marital Status:    Intimate Partner Violence:    • Fear of Current or Ex-Partner:    • Emotionally Abused:    • Physically Abused:    • Sexually Abused:      No Known Allergies  Outpatient Encounter Medications as of 3/29/2021   Medication Sig Dispense Refill   • levothyroxine (SYNTHROID) 50 MCG Tab Take 50 mcg by mouth every day.     • ferrous gluconate (FERGON) 324 (38 Fe) MG Tab Take 324 mg by mouth every morning with breakfast.     • Ascorbic Acid (VITAMIN C) 1000 MG Tab Take  by mouth.     • Cholecalciferol (VITAMIN D3) 5000 UNITS Cap Take 1 Cap by mouth every day.     • docusate sodium 100 MG Cap Take 100 mg by mouth 2 times a day as needed for Constipation. 60 Cap    • ibuprofen (MOTRIN) 600 MG Tab Take 1 Tab by mouth every 6 hours as needed for Mild Pain or Moderate Pain. (Patient not taking: Reported on 3/29/2021) 30 Tab 1   • prenatal plus vitamin (STUARTNATAL 1+1) 27-1 MG TABS tablet Take 1 Tab by mouth every morning. (Patient not taking: Reported on  "3/29/2021) 30 Tab 3     No facility-administered encounter medications on file as of 3/29/2021.     Review of Systems   Constitutional: Negative for chills and fever.   HENT: Negative for sore throat.    Eyes: Negative for blurred vision.   Respiratory: Negative for cough and shortness of breath.    Cardiovascular: Positive for chest pain. Negative for palpitations, claudication, leg swelling and PND.   Gastrointestinal: Negative for abdominal pain and nausea.   Musculoskeletal: Negative for falls and joint pain.   Skin: Negative for rash.   Neurological: Negative for dizziness, focal weakness and weakness.   Endo/Heme/Allergies: Positive for environmental allergies. Does not bruise/bleed easily.        Objective:   /68 (BP Location: Left arm, Patient Position: Sitting, BP Cuff Size: Adult)   Pulse 83   Ht 1.651 m (5' 5\")   Wt 89 kg (196 lb 3.4 oz)   SpO2 97%   BMI 32.65 kg/m²     Physical Exam   Constitutional: No distress.   HENT:   Patient wearing a mask due to COVID precautions   Eyes: No scleral icterus.   Neck: No JVD present.   Cardiovascular: Normal rate and normal heart sounds. Exam reveals no gallop and no friction rub.   No murmur heard.  Pulmonary/Chest: No respiratory distress. She has no wheezes. She has no rales.   Abdominal: Soft. Bowel sounds are normal.   Musculoskeletal:         General: No edema.   Neurological: She is alert.   Skin: No rash noted. She is not diaphoretic.   Psychiatric: She has a normal mood and affect.     We reviewed her prior testing from 2009 normal stress echo and her EKG now was normal    We reviewed in person the most recent labs  Recent Results (from the past 4032 hour(s))   CBC WITH DIFFERENTIAL    Collection Time: 10/31/20  8:28 AM   Result Value Ref Range    WBC 9.5 4.8 - 10.8 K/uL    RBC 5.29 4.20 - 5.40 M/uL    Hemoglobin 13.3 12.0 - 16.0 g/dL    Hematocrit 43.0 37.0 - 47.0 %    MCV 81.3 (L) 81.4 - 97.8 fL    MCH 25.1 (L) 27.0 - 33.0 pg    MCHC 30.9 (L) " 33.6 - 35.0 g/dL    RDW 43.4 35.9 - 50.0 fL    Platelet Count 269 164 - 446 K/uL    MPV 13.4 (H) 9.0 - 12.9 fL    Neutrophils-Polys 53.70 44.00 - 72.00 %    Lymphocytes 37.20 22.00 - 41.00 %    Monocytes 5.10 0.00 - 13.40 %    Eosinophils 3.30 0.00 - 6.90 %    Basophils 0.50 0.00 - 1.80 %    Immature Granulocytes 0.20 0.00 - 0.90 %    Nucleated RBC 0.00 /100 WBC    Neutrophils (Absolute) 5.09 2.00 - 7.15 K/uL    Lymphs (Absolute) 3.52 1.00 - 4.80 K/uL    Monos (Absolute) 0.48 0.00 - 0.85 K/uL    Eos (Absolute) 0.31 0.00 - 0.51 K/uL    Baso (Absolute) 0.05 0.00 - 0.12 K/uL    Immature Granulocytes (abs) 0.02 0.00 - 0.11 K/uL    NRBC (Absolute) 0.00 K/uL   Comp Metabolic Panel    Collection Time: 10/31/20  8:28 AM   Result Value Ref Range    Sodium 138 135 - 145 mmol/L    Potassium 4.5 3.6 - 5.5 mmol/L    Chloride 101 96 - 112 mmol/L    Co2 24 20 - 33 mmol/L    Anion Gap 13.0 7.0 - 16.0    Glucose 78 65 - 99 mg/dL    Bun 12 8 - 22 mg/dL    Creatinine 0.53 0.50 - 1.40 mg/dL    Calcium 9.8 8.5 - 10.5 mg/dL    AST(SGOT) 31 12 - 45 U/L    ALT(SGPT) 33 2 - 50 U/L    Alkaline Phosphatase 87 30 - 99 U/L    Total Bilirubin 1.1 0.1 - 1.5 mg/dL    Albumin 4.6 3.2 - 4.9 g/dL    Total Protein 7.8 6.0 - 8.2 g/dL    Globulin 3.2 1.9 - 3.5 g/dL    A-G Ratio 1.4 g/dL   Lipid Profile    Collection Time: 10/31/20  8:28 AM   Result Value Ref Range    Cholesterol,Tot 227 (H) 100 - 199 mg/dL    Triglycerides 104 0 - 149 mg/dL    HDL 47 >=40 mg/dL     (H) <100 mg/dL   ANTITHYROGLOBULIN AB    Collection Time: 10/31/20  8:28 AM   Result Value Ref Range    Anti-Thyroglobulin <0.9 0.0 - 4.0 IU/mL   TSH    Collection Time: 10/31/20  8:28 AM   Result Value Ref Range    TSH 1.100 0.380 - 5.330 uIU/mL   T3 FREE    Collection Time: 10/31/20  8:28 AM   Result Value Ref Range    T3,Free 3.23 2.00 - 4.40 pg/mL   FREE THYROXINE    Collection Time: 10/31/20  8:28 AM   Result Value Ref Range    Free T-4 1.06 0.93 - 1.70 ng/dL   VITAMIN D,25  HYDROXY    Collection Time: 10/31/20  8:28 AM   Result Value Ref Range    25-Hydroxy   Vitamin D 25 58 30 - 100 ng/mL   FASTING STATUS    Collection Time: 10/31/20  8:28 AM   Result Value Ref Range    Fasting Status Fasting    ESTIMATED GFR    Collection Time: 10/31/20  8:28 AM   Result Value Ref Range    GFR If African American >60 >60 mL/min/1.73 m 2    GFR If Non African American >60 >60 mL/min/1.73 m 2   FASTING STATUS    Collection Time: 02/20/21  7:28 AM   Result Value Ref Range    Fasting Status Fasting    CBC WITH DIFFERENTIAL    Collection Time: 02/20/21  7:42 AM   Result Value Ref Range    WBC 9.4 4.8 - 10.8 K/uL    RBC 4.91 4.20 - 5.40 M/uL    Hemoglobin 11.8 (L) 12.0 - 16.0 g/dL    Hematocrit 38.9 37.0 - 47.0 %    MCV 79.2 (L) 81.4 - 97.8 fL    MCH 24.0 (L) 27.0 - 33.0 pg    MCHC 30.3 (L) 33.6 - 35.0 g/dL    RDW 42.5 35.9 - 50.0 fL    Platelet Count 286 164 - 446 K/uL    MPV 12.9 9.0 - 12.9 fL    Neutrophils-Polys 55.60 44.00 - 72.00 %    Lymphocytes 34.70 22.00 - 41.00 %    Monocytes 5.30 0.00 - 13.40 %    Eosinophils 3.50 0.00 - 6.90 %    Basophils 0.60 0.00 - 1.80 %    Immature Granulocytes 0.30 0.00 - 0.90 %    Nucleated RBC 0.00 /100 WBC    Neutrophils (Absolute) 5.23 2.00 - 7.15 K/uL    Lymphs (Absolute) 3.27 1.00 - 4.80 K/uL    Monos (Absolute) 0.50 0.00 - 0.85 K/uL    Eos (Absolute) 0.33 0.00 - 0.51 K/uL    Baso (Absolute) 0.06 0.00 - 0.12 K/uL    Immature Granulocytes (abs) 0.03 0.00 - 0.11 K/uL    NRBC (Absolute) 0.00 K/uL   Comp Metabolic Panel    Collection Time: 02/20/21  7:42 AM   Result Value Ref Range    Sodium 136 135 - 145 mmol/L    Potassium 4.1 3.6 - 5.5 mmol/L    Chloride 103 96 - 112 mmol/L    Co2 23 20 - 33 mmol/L    Anion Gap 10.0 7.0 - 16.0    Glucose 79 65 - 99 mg/dL    Bun 12 8 - 22 mg/dL    Creatinine 0.45 (L) 0.50 - 1.40 mg/dL    Calcium 9.7 8.5 - 10.5 mg/dL    AST(SGOT) 37 12 - 45 U/L    ALT(SGPT) 40 2 - 50 U/L    Alkaline Phosphatase 86 30 - 99 U/L    Total Bilirubin 1.1  0.1 - 1.5 mg/dL    Albumin 4.1 3.2 - 4.9 g/dL    Total Protein 7.2 6.0 - 8.2 g/dL    Globulin 3.1 1.9 - 3.5 g/dL    A-G Ratio 1.3 g/dL   Lipid Profile    Collection Time: 02/20/21  7:42 AM   Result Value Ref Range    Cholesterol,Tot 207 (H) 100 - 199 mg/dL    Triglycerides 122 0 - 149 mg/dL    HDL 42 >=40 mg/dL     (H) <100 mg/dL   ANTITHYROGLOBULIN AB    Collection Time: 02/20/21  7:42 AM   Result Value Ref Range    Anti-Thyroglobulin <0.9 0.0 - 4.0 IU/mL   TSH    Collection Time: 02/20/21  7:42 AM   Result Value Ref Range    TSH 1.120 0.380 - 5.330 uIU/mL   T3 FREE    Collection Time: 02/20/21  7:42 AM   Result Value Ref Range    T3,Free 3.58 2.00 - 4.40 pg/mL   FREE THYROXINE    Collection Time: 02/20/21  7:42 AM   Result Value Ref Range    Free T-4 1.11 0.93 - 1.70 ng/dL   VITAMIN D,25 HYDROXY    Collection Time: 02/20/21  7:42 AM   Result Value Ref Range    25-Hydroxy   Vitamin D 25 62 30 - 100 ng/mL   ESTIMATED GFR    Collection Time: 02/20/21  7:42 AM   Result Value Ref Range    GFR If African American >60 >60 mL/min/1.73 m 2    GFR If Non African American >60 >60 mL/min/1.73 m 2       Assessment:     1. Dyslipidemia     2. Precordial chest pain     3. Obesity (BMI 30-39.9)         Medical Decision Making:  Today's Assessment / Status / Plan:     It was my pleasure to meet with Ms. Yanez.    Blood pressure is well controlled.  She will continue to monitor and eat hearty healthy diet.    She does have dyslipidemia but is not recommended statin    For her chest pains the same overall benign but certainly if there was a change the most appropriate test is likely do stress echo she would let us know    We discussed the importance of meaningful weight loss.      Ms. Yanez does not require regular cardiology follow up, I have advised her to call our office or e-mail using my MyChart if needed.    It is my pleasure to participate in the care of Ms. Yanez.  Please do not hesitate to contact me with  questions or concerns.    Brandan Montiel MD PhD [facility]  Cardiologist [facility]    Please note that this dictation was created using voice recognition software. There may be errors I did not discover before finalizing the note.

## 2021-06-23 ENCOUNTER — PRE-ADMISSION TESTING (OUTPATIENT)
Dept: ADMISSIONS | Facility: MEDICAL CENTER | Age: 41
End: 2021-06-23
Attending: SURGERY
Payer: COMMERCIAL

## 2021-06-23 DIAGNOSIS — Z01.812 PRE-OPERATIVE LABORATORY EXAMINATION: ICD-10-CM

## 2021-06-23 LAB
ERYTHROCYTE [DISTWIDTH] IN BLOOD BY AUTOMATED COUNT: 44.1 FL (ref 35.9–50)
HCT VFR BLD AUTO: 40.7 % (ref 37–47)
HGB BLD-MCNC: 12.8 G/DL (ref 12–16)
MCH RBC QN AUTO: 26.2 PG (ref 27–33)
MCHC RBC AUTO-ENTMCNC: 31.4 G/DL (ref 33.6–35)
MCV RBC AUTO: 83.4 FL (ref 81.4–97.8)
PLATELET # BLD AUTO: 248 K/UL (ref 164–446)
PMV BLD AUTO: 12.4 FL (ref 9–12.9)
RBC # BLD AUTO: 4.88 M/UL (ref 4.2–5.4)
WBC # BLD AUTO: 10.3 K/UL (ref 4.8–10.8)

## 2021-06-23 PROCEDURE — 36415 COLL VENOUS BLD VENIPUNCTURE: CPT

## 2021-06-23 PROCEDURE — 85027 COMPLETE CBC AUTOMATED: CPT

## 2021-06-23 ASSESSMENT — FIBROSIS 4 INDEX: FIB4 SCORE: 0.78

## 2021-07-13 NOTE — OR NURSING
COVID-19 Pre-surgery screenin. Do you have an undiagnosed respiratory illness or symptoms such as coughing or sneezing? No  2. set of Sx No  a. Acute vs. chronic respiratory illness No    3. Do you have an unexplained fever greater than 100.4 degrees Fahrenheit or 38 degrees Celsius?     No    4. Have you had direct exposure to a patient who tested positive for Covid-19?    No     5. Have you had any loss of your sense of taste or smell? Have you had N/V or sore throat? No    Patient has been informed of visitor policy and asked to wear a mask upon entering the hospital   Yes

## 2021-07-14 ENCOUNTER — ANESTHESIA (OUTPATIENT)
Dept: SURGERY | Facility: MEDICAL CENTER | Age: 41
End: 2021-07-14
Payer: COMMERCIAL

## 2021-07-14 ENCOUNTER — ANESTHESIA EVENT (OUTPATIENT)
Dept: SURGERY | Facility: MEDICAL CENTER | Age: 41
End: 2021-07-14
Payer: COMMERCIAL

## 2021-07-14 ENCOUNTER — HOSPITAL ENCOUNTER (OUTPATIENT)
Facility: MEDICAL CENTER | Age: 41
End: 2021-07-14
Attending: SURGERY | Admitting: SURGERY
Payer: COMMERCIAL

## 2021-07-14 VITALS
WEIGHT: 200.4 LBS | TEMPERATURE: 97.7 F | HEIGHT: 65 IN | BODY MASS INDEX: 33.39 KG/M2 | RESPIRATION RATE: 19 BRPM | DIASTOLIC BLOOD PRESSURE: 69 MMHG | HEART RATE: 63 BPM | SYSTOLIC BLOOD PRESSURE: 113 MMHG | OXYGEN SATURATION: 96 %

## 2021-07-14 DIAGNOSIS — G89.18 ACUTE POSTOPERATIVE PAIN: ICD-10-CM

## 2021-07-14 DIAGNOSIS — K42.9 PERIUMBILICAL HERNIA: ICD-10-CM

## 2021-07-14 LAB — HCG UR QL: NEGATIVE

## 2021-07-14 PROCEDURE — 700101 HCHG RX REV CODE 250: Performed by: SURGERY

## 2021-07-14 PROCEDURE — 160036 HCHG PACU - EA ADDL 30 MINS PHASE I: Performed by: SURGERY

## 2021-07-14 PROCEDURE — A9270 NON-COVERED ITEM OR SERVICE: HCPCS | Performed by: SURGERY

## 2021-07-14 PROCEDURE — 160009 HCHG ANES TIME/MIN: Performed by: SURGERY

## 2021-07-14 PROCEDURE — 160046 HCHG PACU - 1ST 60 MINS PHASE II: Performed by: SURGERY

## 2021-07-14 PROCEDURE — 500002 HCHG ADHESIVE, DERMABOND: Performed by: SURGERY

## 2021-07-14 PROCEDURE — A9270 NON-COVERED ITEM OR SERVICE: HCPCS | Performed by: ANESTHESIOLOGY

## 2021-07-14 PROCEDURE — 160031 HCHG SURGERY MINUTES - 1ST 30 MINS LEVEL 5: Performed by: SURGERY

## 2021-07-14 PROCEDURE — 700102 HCHG RX REV CODE 250 W/ 637 OVERRIDE(OP): Performed by: ANESTHESIOLOGY

## 2021-07-14 PROCEDURE — 700105 HCHG RX REV CODE 258: Performed by: SURGERY

## 2021-07-14 PROCEDURE — 160048 HCHG OR STATISTICAL LEVEL 1-5: Performed by: SURGERY

## 2021-07-14 PROCEDURE — 500868 HCHG NEEDLE, SURGI(VARES): Performed by: SURGERY

## 2021-07-14 PROCEDURE — 81025 URINE PREGNANCY TEST: CPT

## 2021-07-14 PROCEDURE — 700111 HCHG RX REV CODE 636 W/ 250 OVERRIDE (IP): Performed by: SURGERY

## 2021-07-14 PROCEDURE — 160035 HCHG PACU - 1ST 60 MINS PHASE I: Performed by: SURGERY

## 2021-07-14 PROCEDURE — 700101 HCHG RX REV CODE 250: Performed by: ANESTHESIOLOGY

## 2021-07-14 PROCEDURE — 160002 HCHG RECOVERY MINUTES (STAT): Performed by: SURGERY

## 2021-07-14 PROCEDURE — 700111 HCHG RX REV CODE 636 W/ 250 OVERRIDE (IP): Performed by: ANESTHESIOLOGY

## 2021-07-14 PROCEDURE — C1781 MESH (IMPLANTABLE): HCPCS | Performed by: SURGERY

## 2021-07-14 PROCEDURE — 160025 RECOVERY II MINUTES (STATS): Performed by: SURGERY

## 2021-07-14 PROCEDURE — 502714 HCHG ROBOTIC SURGERY SERVICES: Performed by: SURGERY

## 2021-07-14 PROCEDURE — 501838 HCHG SUTURE GENERAL: Performed by: SURGERY

## 2021-07-14 PROCEDURE — 160042 HCHG SURGERY MINUTES - EA ADDL 1 MIN LEVEL 5: Performed by: SURGERY

## 2021-07-14 DEVICE — MESH PROGRIP LAPROSCOPIC SELF FIXATING (1/CA): Type: IMPLANTABLE DEVICE | Site: ABDOMEN | Status: FUNCTIONAL

## 2021-07-14 RX ORDER — MEPERIDINE HYDROCHLORIDE 25 MG/ML
12.5 INJECTION INTRAMUSCULAR; INTRAVENOUS; SUBCUTANEOUS
Status: DISCONTINUED | OUTPATIENT
Start: 2021-07-14 | End: 2021-07-14 | Stop reason: HOSPADM

## 2021-07-14 RX ORDER — IBUPROFEN 800 MG/1
800 TABLET ORAL
Qty: 21 TABLET | Refills: 0 | Status: SHIPPED | OUTPATIENT
Start: 2021-07-14 | End: 2021-07-21

## 2021-07-14 RX ORDER — HALOPERIDOL 5 MG/ML
1 INJECTION INTRAMUSCULAR
Status: DISCONTINUED | OUTPATIENT
Start: 2021-07-14 | End: 2021-07-14 | Stop reason: HOSPADM

## 2021-07-14 RX ORDER — CELECOXIB 200 MG/1
400 CAPSULE ORAL ONCE
Status: COMPLETED | OUTPATIENT
Start: 2021-07-14 | End: 2021-07-14

## 2021-07-14 RX ORDER — MIDAZOLAM HYDROCHLORIDE 1 MG/ML
INJECTION INTRAMUSCULAR; INTRAVENOUS PRN
Status: DISCONTINUED | OUTPATIENT
Start: 2021-07-14 | End: 2021-07-14 | Stop reason: SURG

## 2021-07-14 RX ORDER — HYDROMORPHONE HYDROCHLORIDE 1 MG/ML
0.4 INJECTION, SOLUTION INTRAMUSCULAR; INTRAVENOUS; SUBCUTANEOUS
Status: DISCONTINUED | OUTPATIENT
Start: 2021-07-14 | End: 2021-07-14 | Stop reason: HOSPADM

## 2021-07-14 RX ORDER — DIPHENHYDRAMINE HYDROCHLORIDE 50 MG/ML
INJECTION INTRAMUSCULAR; INTRAVENOUS PRN
Status: DISCONTINUED | OUTPATIENT
Start: 2021-07-14 | End: 2021-07-14 | Stop reason: SURG

## 2021-07-14 RX ORDER — HYDROMORPHONE HYDROCHLORIDE 1 MG/ML
0.5 INJECTION, SOLUTION INTRAMUSCULAR; INTRAVENOUS; SUBCUTANEOUS
Status: DISCONTINUED | OUTPATIENT
Start: 2021-07-14 | End: 2021-07-14 | Stop reason: HOSPADM

## 2021-07-14 RX ORDER — OXYCODONE HYDROCHLORIDE 5 MG/1
5 TABLET ORAL EVERY 6 HOURS PRN
Qty: 12 TABLET | Refills: 0 | Status: SHIPPED | OUTPATIENT
Start: 2021-07-14 | End: 2021-07-17

## 2021-07-14 RX ORDER — ACETAMINOPHEN 500 MG
1000 TABLET ORAL EVERY 6 HOURS
Qty: 56 TABLET | Refills: 0 | Status: SHIPPED | OUTPATIENT
Start: 2021-07-14 | End: 2021-07-21

## 2021-07-14 RX ORDER — GABAPENTIN 300 MG/1
300 CAPSULE ORAL ONCE
Status: COMPLETED | OUTPATIENT
Start: 2021-07-14 | End: 2021-07-14

## 2021-07-14 RX ORDER — CEFAZOLIN SODIUM 1 G/3ML
INJECTION, POWDER, FOR SOLUTION INTRAMUSCULAR; INTRAVENOUS PRN
Status: DISCONTINUED | OUTPATIENT
Start: 2021-07-14 | End: 2021-07-14 | Stop reason: SURG

## 2021-07-14 RX ORDER — METHOCARBAMOL 500 MG/1
500 TABLET, FILM COATED ORAL 4 TIMES DAILY
Status: DISCONTINUED | OUTPATIENT
Start: 2021-07-14 | End: 2021-07-14

## 2021-07-14 RX ORDER — OXYCODONE HCL 5 MG/5 ML
10 SOLUTION, ORAL ORAL
Status: DISCONTINUED | OUTPATIENT
Start: 2021-07-14 | End: 2021-07-14 | Stop reason: HOSPADM

## 2021-07-14 RX ORDER — ACETAMINOPHEN 500 MG
1000 TABLET ORAL ONCE
Status: COMPLETED | OUTPATIENT
Start: 2021-07-14 | End: 2021-07-14

## 2021-07-14 RX ORDER — OXYCODONE HCL 5 MG/5 ML
5 SOLUTION, ORAL ORAL
Status: DISCONTINUED | OUTPATIENT
Start: 2021-07-14 | End: 2021-07-14 | Stop reason: HOSPADM

## 2021-07-14 RX ORDER — BUPIVACAINE HYDROCHLORIDE AND EPINEPHRINE 5; 5 MG/ML; UG/ML
INJECTION, SOLUTION EPIDURAL; INTRACAUDAL; PERINEURAL
Status: DISCONTINUED | OUTPATIENT
Start: 2021-07-14 | End: 2021-07-14 | Stop reason: HOSPADM

## 2021-07-14 RX ORDER — DEXAMETHASONE SODIUM PHOSPHATE 4 MG/ML
INJECTION, SOLUTION INTRA-ARTICULAR; INTRALESIONAL; INTRAMUSCULAR; INTRAVENOUS; SOFT TISSUE PRN
Status: DISCONTINUED | OUTPATIENT
Start: 2021-07-14 | End: 2021-07-14 | Stop reason: SURG

## 2021-07-14 RX ORDER — SCOLOPAMINE TRANSDERMAL SYSTEM 1 MG/1
PATCH, EXTENDED RELEASE TRANSDERMAL PRN
Status: DISCONTINUED | OUTPATIENT
Start: 2021-07-14 | End: 2021-07-14 | Stop reason: SURG

## 2021-07-14 RX ORDER — SODIUM CHLORIDE, SODIUM LACTATE, POTASSIUM CHLORIDE, CALCIUM CHLORIDE 600; 310; 30; 20 MG/100ML; MG/100ML; MG/100ML; MG/100ML
INJECTION, SOLUTION INTRAVENOUS CONTINUOUS
Status: DISCONTINUED | OUTPATIENT
Start: 2021-07-14 | End: 2021-07-14 | Stop reason: HOSPADM

## 2021-07-14 RX ORDER — HYDROMORPHONE HYDROCHLORIDE 1 MG/ML
0.2 INJECTION, SOLUTION INTRAMUSCULAR; INTRAVENOUS; SUBCUTANEOUS
Status: DISCONTINUED | OUTPATIENT
Start: 2021-07-14 | End: 2021-07-14 | Stop reason: HOSPADM

## 2021-07-14 RX ORDER — DIPHENHYDRAMINE HYDROCHLORIDE 50 MG/ML
12.5 INJECTION INTRAMUSCULAR; INTRAVENOUS
Status: DISCONTINUED | OUTPATIENT
Start: 2021-07-14 | End: 2021-07-14 | Stop reason: HOSPADM

## 2021-07-14 RX ORDER — ROCURONIUM BROMIDE 10 MG/ML
INJECTION, SOLUTION INTRAVENOUS PRN
Status: DISCONTINUED | OUTPATIENT
Start: 2021-07-14 | End: 2021-07-14 | Stop reason: SURG

## 2021-07-14 RX ORDER — LORAZEPAM 2 MG/ML
0.5 INJECTION INTRAMUSCULAR
Status: DISCONTINUED | OUTPATIENT
Start: 2021-07-14 | End: 2021-07-14 | Stop reason: HOSPADM

## 2021-07-14 RX ORDER — ONDANSETRON 2 MG/ML
4 INJECTION INTRAMUSCULAR; INTRAVENOUS
Status: DISCONTINUED | OUTPATIENT
Start: 2021-07-14 | End: 2021-07-14 | Stop reason: HOSPADM

## 2021-07-14 RX ORDER — SODIUM CHLORIDE, SODIUM LACTATE, POTASSIUM CHLORIDE, CALCIUM CHLORIDE 600; 310; 30; 20 MG/100ML; MG/100ML; MG/100ML; MG/100ML
INJECTION, SOLUTION INTRAVENOUS CONTINUOUS
Status: DISCONTINUED | OUTPATIENT
Start: 2021-07-14 | End: 2021-07-14

## 2021-07-14 RX ORDER — HYDRALAZINE HYDROCHLORIDE 20 MG/ML
5 INJECTION INTRAMUSCULAR; INTRAVENOUS
Status: DISCONTINUED | OUTPATIENT
Start: 2021-07-14 | End: 2021-07-14 | Stop reason: HOSPADM

## 2021-07-14 RX ORDER — ONDANSETRON 2 MG/ML
INJECTION INTRAMUSCULAR; INTRAVENOUS PRN
Status: DISCONTINUED | OUTPATIENT
Start: 2021-07-14 | End: 2021-07-14 | Stop reason: SURG

## 2021-07-14 RX ORDER — HYDROMORPHONE HYDROCHLORIDE 2 MG/ML
INJECTION, SOLUTION INTRAMUSCULAR; INTRAVENOUS; SUBCUTANEOUS PRN
Status: DISCONTINUED | OUTPATIENT
Start: 2021-07-14 | End: 2021-07-14 | Stop reason: SURG

## 2021-07-14 RX ORDER — LIDOCAINE HYDROCHLORIDE 20 MG/ML
INJECTION, SOLUTION EPIDURAL; INFILTRATION; INTRACAUDAL; PERINEURAL PRN
Status: DISCONTINUED | OUTPATIENT
Start: 2021-07-14 | End: 2021-07-14 | Stop reason: SURG

## 2021-07-14 RX ORDER — SCOLOPAMINE TRANSDERMAL SYSTEM 1 MG/1
PATCH, EXTENDED RELEASE TRANSDERMAL
Status: COMPLETED
Start: 2021-07-14 | End: 2021-07-14

## 2021-07-14 RX ADMIN — HYDROMORPHONE HYDROCHLORIDE 1 MG: 2 INJECTION, SOLUTION INTRAMUSCULAR; INTRAVENOUS; SUBCUTANEOUS at 16:08

## 2021-07-14 RX ADMIN — DIPHENHYDRAMINE HYDROCHLORIDE 12.5 MG: 50 INJECTION, SOLUTION INTRAMUSCULAR; INTRAVENOUS at 16:13

## 2021-07-14 RX ADMIN — SODIUM CHLORIDE, POTASSIUM CHLORIDE, SODIUM LACTATE AND CALCIUM CHLORIDE: 600; 310; 30; 20 INJECTION, SOLUTION INTRAVENOUS at 15:17

## 2021-07-14 RX ADMIN — CEFAZOLIN 2 G: 330 INJECTION, POWDER, FOR SOLUTION INTRAMUSCULAR; INTRAVENOUS at 16:04

## 2021-07-14 RX ADMIN — PROPOFOL 150 MG: 10 INJECTION, EMULSION INTRAVENOUS at 16:08

## 2021-07-14 RX ADMIN — MIDAZOLAM HYDROCHLORIDE 2 MG: 1 INJECTION, SOLUTION INTRAMUSCULAR; INTRAVENOUS at 16:04

## 2021-07-14 RX ADMIN — ROCURONIUM BROMIDE 70 MG: 10 INJECTION, SOLUTION INTRAVENOUS at 16:08

## 2021-07-14 RX ADMIN — DEXAMETHASONE SODIUM PHOSPHATE 8 MG: 4 INJECTION, SOLUTION INTRA-ARTICULAR; INTRALESIONAL; INTRAMUSCULAR; INTRAVENOUS; SOFT TISSUE at 16:13

## 2021-07-14 RX ADMIN — SCOPALAMINE 1 PATCH: 1 PATCH, EXTENDED RELEASE TRANSDERMAL at 15:50

## 2021-07-14 RX ADMIN — METHOCARBAMOL 1000 MG: 100 INJECTION INTRAMUSCULAR; INTRAVENOUS at 17:50

## 2021-07-14 RX ADMIN — LIDOCAINE HYDROCHLORIDE 100 MG: 20 INJECTION, SOLUTION EPIDURAL; INFILTRATION; INTRACAUDAL at 16:08

## 2021-07-14 RX ADMIN — CELECOXIB 400 MG: 200 CAPSULE ORAL at 15:16

## 2021-07-14 RX ADMIN — LIDOCAINE HYDROCHLORIDE 0.5 ML: 10 INJECTION, SOLUTION EPIDURAL; INFILTRATION; INTRACAUDAL; PERINEURAL at 15:18

## 2021-07-14 RX ADMIN — ONDANSETRON 4 MG: 2 INJECTION INTRAMUSCULAR; INTRAVENOUS at 16:57

## 2021-07-14 RX ADMIN — ACETAMINOPHEN 1000 MG: 500 TABLET ORAL at 15:16

## 2021-07-14 RX ADMIN — SUGAMMADEX 200 MG: 100 INJECTION, SOLUTION INTRAVENOUS at 17:00

## 2021-07-14 RX ADMIN — POVIDONE IODINE 15 ML: 100 SOLUTION TOPICAL at 15:17

## 2021-07-14 RX ADMIN — GABAPENTIN 300 MG: 300 CAPSULE ORAL at 15:16

## 2021-07-14 RX ADMIN — SODIUM CHLORIDE, POTASSIUM CHLORIDE, SODIUM LACTATE AND CALCIUM CHLORIDE: 600; 310; 30; 20 INJECTION, SOLUTION INTRAVENOUS at 16:57

## 2021-07-14 ASSESSMENT — PAIN DESCRIPTION - PAIN TYPE
TYPE: SURGICAL PAIN

## 2021-07-14 ASSESSMENT — PAIN SCALES - GENERAL: PAIN_LEVEL: 0

## 2021-07-14 ASSESSMENT — FIBROSIS 4 INDEX: FIB4 SCORE: 0.9

## 2021-07-14 NOTE — OR NURSING
Pre-op assessment complete, VSS stable, pt and family updated on POC. Call light within reach. Denies needs at this time.

## 2021-07-14 NOTE — ANESTHESIA PROCEDURE NOTES
Airway    Date/Time: 7/14/2021 4:09 PM  Performed by: Chacho Tobias M.D.  Authorized by: Chacho Tobias M.D.     Location:  OR  Urgency:  Elective  Difficult Airway: No    Indications for Airway Management:  Anesthesia      Spontaneous Ventilation: absent    Sedation Level:  Deep  Preoxygenated: Yes    Patient Position:  Sniffing  Mask Difficulty Assessment:  1 - vent by mask  Final Airway Type:  Endotracheal airway  Final Endotracheal Airway:  ETT  Cuffed: Yes    Technique Used for Successful ETT Placement:  Direct laryngoscopy    Insertion Site:  Oral  Blade Type:  Wilhelm  Laryngoscope Blade/Videolaryngoscope Blade Size:  2  ETT Size (mm):  7.0  Measured from:  Lips  ETT to Lips (cm):  21  Placement Verified by: auscultation and capnometry    Cormack-Lehane Classification:  Grade I - full view of glottis  Number of Attempts at Approach:  1

## 2021-07-14 NOTE — ANESTHESIA PREPROCEDURE EVALUATION
Obesity, hypothyroid, prior GA without issues, Denies: MI/CHF/smoking/CVA/DM/CKD        Physical Exam    Airway   Mallampati: II  TM distance: >3 FB  Neck ROM: full       Cardiovascular - normal exam  Rhythm: regular  Rate: normal  (-) murmur     Dental - normal exam           Pulmonary - normal exam  Breath sounds clear to auscultation     Abdominal    Neurological - normal exam                 Anesthesia Plan    ASA 2       Plan - general       Airway plan will be ETT          Induction: intravenous    Postoperative Plan: Postoperative administration of opioids is intended.    Pertinent diagnostic labs and testing reviewed    Informed Consent:    Anesthetic plan and risks discussed with patient.    Use of blood products discussed with: patient whom consented to blood products.

## 2021-07-15 NOTE — DISCHARGE INSTRUCTIONS
Hernia Repair Discharge Instructions:    1.  ACTIVITIES: Upon discharge from the hospital, the day of surgery it is requested that you do no significant physical activity and limit mental activities, as you have had sedation. The day after surgery, you may resume activities of daily living, but for four weeks, it is recommended that you do no strenuous activities or heavy lifting (greater than 15 pounds).     2.  DRIVING: You may drive whenever you are off pain medications and are able to perform the activities needed to drive, i.e. turning, bending, twisting, etc.     3.  WOUND: It is not unusual for patients to experience swelling and even bruising at the hernia repair site. With inguinal hernias, sometimes the bruising and swelling may occur at incisions sites, or extend on to the penis or into the scrotum of male patients. This will resolve over the next few days.     4.  ICE: please use ice on the wound to decrease the swelling for the first 24 hours and then discontinue.     5.  BATHING: The dressing can be removed two days after surgery and the wound can then be wetted in a shower as normal, but avoid submersion in water (tub bath) for at least a week.    6.  PAIN MEDICATION: You will be given a prescription for pain medication at discharge. Please take these as directed. It is important to remember not to take medications on an empty stomach as this may cause nausea.     7.  BOWEL FUNCTION: After hernia repair, it is not uncommon for patients to experience constipation. This is due to decreasing activity levels as well as pain medications. You may wish to use a stool softener beginning immediately after surgery, and you may or may not need to use a laxative (Milk of Magnesia, Ex-lax; Senokot, etc.) as well.     8.  CALL IF YOU HAVE: (1) Fevers to more than 1010 F, (2) Unusual chest or leg pain, (3) Drainage or fluid from incision that may be foul smelling, increased tenderness or soreness at the wound or the  wound edges are no longer together,redness or swelling at the incision site. Please do not hesitate to call with any other questions.     9. APPOINTMENT: Contact our office at [phone] for a follow-up appointment in 1 to 2 weeks following your procedure.     If you have any additional questions, please do not hesitate to call the office and speak to either myself or the physician on call.     Office address:  [address] [place], [place] [address]e [facility] [place], [place] [address]    Mark Andrews M.D.  [facility]  [phone]      ACTIVITY: Rest and take it easy for the first 24 hours.  A responsible adult is recommended to remain with you during that time.  It is normal to feel sleepy.  We encourage you to not do anything that requires balance, judgment or coordination.    MILD FLU-LIKE SYMPTOMS ARE NORMAL. YOU MAY EXPERIENCE GENERALIZED MUSCLE ACHES, THROAT IRRITATION, HEADACHE AND/OR SOME NAUSEA.    FOR 24 HOURS DO NOT:  Drive, operate machinery or run household appliances.  Drink beer or alcoholic beverages.   Make important decisions or sign legal documents.    DIET: To avoid nausea, slowly advance diet as tolerated, avoiding spicy or greasy foods for the first day.  Add more substantial food to your diet according to your physician's instructions.  INCREASE FLUIDS AND FIBER TO AVOID CONSTIPATION.    FOLLOW-UP APPOINTMENT:  A follow-up appointment should be arranged with your doctor in 1-2 weeks; call to schedule.    You should CALL YOUR PHYSICIAN if you develop:  Fever greater than 101 degrees F.  Pain not relieved by medication, or persistent nausea or vomiting.  Excessive bleeding (blood soaking through dressing) or unexpected drainage from the wound.  Extreme redness or swelling around the incision site, drainage of pus or foul smelling drainage.  Inability to urinate or empty your bladder within 8 hours.  Problems with breathing or chest pain.    You should call 911 if you develop problems with breathing or chest  pain.  If you are unable to contact your doctor or surgical center, you should go to the nearest emergency room or urgent care center.  Physician's telephone #: Dr. Andrews [phone]    If any questions arise, call your doctor.  If your doctor is not available, please feel free to call the Surgical Center at [phone]. The Contact Center is open Monday through Friday 7AM to 5PM and may speak to a nurse at [phone], or toll free at [phone].     A registered nurse may call you a few days after your surgery to see how you are doing after your procedure.    MEDICATIONS: Resume taking daily medication.  Take prescribed pain medication with food.  If no medication is prescribed, you may take non-aspirin pain medication if needed.  PAIN MEDICATION CAN BE VERY CONSTIPATING.  Take a stool softener or laxative such as senokot, pericolace, or milk of magnesia if needed.    Prescription for Tylenol, Motrin, and oxycodone sent to pharmacy.  Last pain medication given: none give in recovery.    If your physician has prescribed pain medication that includes Acetaminophen (Tylenol), do not take additional Acetaminophen (Tylenol) while taking the prescribed medication.    Depression / Suicide Risk    As you are discharged from this [facility] facility, it is important to learn how to keep safe from harming yourself.    Recognize the warning signs:  · Abrupt changes in personality, positive or negative- including increase in energy   · Giving away possessions  · Change in eating patterns- significant weight changes-  positive or negative  · Change in sleeping patterns- unable to sleep or sleeping all the time   · Unwillingness or inability to communicate  · Depression  · Unusual sadness, discouragement and loneliness  · Talk of wanting to die  · Neglect of personal appearance   · Rebelliousness- reckless behavior  · Withdrawal from people/activities they love  · Confusion- inability to concentrate     If you or a  loved one observes any of these behaviors or has concerns about self-harm, here's what you can do:  · Talk about it- your feelings and reasons for harming yourself  · Remove any means that you might use to hurt yourself (examples: pills, rope, extension cords, firearm)  · Get professional help from the community (Mental Health, Substance Abuse, psychological counseling)  · Do not be alone:Call your Safe Contact- someone whom you trust who will be there for you.  · Call your local CRISIS HOTLINE 898-9389 or 807-139-2317  · Call your local Children's Mobile Crisis Response Team [place] (783) 336-2293 or www.[employer]  · Call the toll free National Suicide Prevention Hotlines   · National Suicide Prevention Lifeline 496-167-XRRP (4650)  · National Hope Line Network 800-SUICIDE (520-7338)

## 2021-07-15 NOTE — ANESTHESIA TIME REPORT
Anesthesia Start and Stop Event Times     Date Time Event    7/14/2021 1450 Ready for Procedure     1604 Anesthesia Start     1713 Anesthesia Stop        Responsible Staff  07/14/21    Name Role Begin End    Chacho Tobias M.D. Anesth 1604 171        Preop Diagnosis (Free Text):  Pre-op Diagnosis     UMBILICAL HERNIA        Preop Diagnosis (Codes):    Post op Diagnosis  Umbilical hernia      Premium Reason  A. 3PM - 7AM    Comments:

## 2021-07-15 NOTE — OP REPORT
Operative Report    Date: 7/14/2021    PreOp Diagnosis:   1.  Periumbilical hernia    PostOp Diagnosis: Same    Procedure(s):  REPAIR, HERNIA, VENTRAL/PERIUMBILICAL , ROBOT-ASSISTED, USING DA ADRIANE XI - WITH MESH. - Wound Class: Clean    Surgeon(s):  Mark Andrews M.D.    First Assist:   THEO Dennis  (a surgical 1st assistant was required for the entire the procedure for management of passage of equipment during robot procedure)    Anesthesiologist/Type of Anesthesia:  Anesthesiologist: Chacho Tobias M.D./General    Surgical Staff:  Circulator: Maddi Gerard R.N.; Rom Humphries R.N.  Scrub Person: Dinorah Pennington    Specimens removed if any:  * No specimens in log *    Estimated Blood Loss: 5 mL    Findings: Periumbilical hernia, partially reducible    Complications: None noted    Outcome: Transferred to PACU in stable condition    Indications:  40-year-old female presenting with a enlarging and increasingly symptomatic periumbilical hernia for which the above procedure was recommended.  It was discussed with her in detail: The risk, benefits, alternatives, she wished to proceed.    Procedure in detail:   The patient was brought to the operating room and was placed in the supine position where general endotracheal anesthesia was induced. SCDs were in place and functioning. Preoperative antibiotics of ancef were given before incision time. The patient's abdomen was prepped and draped in the usual sterile fashion.  Patient was then slightly flexed on the operating table.      After infiltration of local anesthetic, a skin incision was made to accommodate an 8mm robotic port in the left upper quadrant.  A Veress needle was used to access the peritoneum, and after saline drop test, the abdomen was insufflated to 15 mmHg, which the patient tolerated well.  The robotic camera was then introduced and the abdomen inspected for evidence of trauma secondary to Veress needle or port placement, and found  none.     Utilizing the robotic camera with the patient in the slightly flexed position, and after infiltration of local anesthetic, two additional 8-mm left sided ports were inserted; one in the left lateral abdomen, and the other in the left lower quadrant. The robot was brought in and the robotic arms attached.  The camera was positioned, and the working instruments were brought in under direct vision.     Starting the left lateral abdominal wall, the peritoneum was taken down from the left upper quadrant to left lower quadrant and then  from the fascia all the way to the right side of the abdominal wall.  The small umbilical hernia fascial defect was encountered, and contained a moderate amount of omental fat.  This was reduced with careful traction using external pressure and the robotic grasper.  The hernia defect measured approximately 1.5 cm.  The hernia sac was then removed using a combination of sharp and electrocautery dissection.  The fascial edges of all hernia defects were then brought together using a running 0 stratafix barbed suture.  This defect came together well.  Care was taken to avoid tethering of the overlying skin.     A [id number] Progrip mesh with the sharp edges trimmed to approximately 9 x 9 cm was then used to cover the closed defect.  Care was taken to ensure that the mesh laid flat against the abdominal fascia. The result was a adequate, well-positioned mesh surface.  Hemostasis was ensured.  The left sided peritoneal defect was then closed using running 2-0 Monocryl stratafix suture.  This completely covered the Progrip mesh, without any peritoneal defects.     The insufflation and the trochars were removed under direct vision, and hemostasis confirmed.  The skin of all incisions was closed with subcuticular suture.   Dermabond was applied.     All sponge, needle, and instrument counts were reported as correct at the end of the procedure. The patient tolerated the procedure  well and left the operating room for the recovery room in stable and satisfactory condition.      7/14/2021 5:01 PM Mark Andrews M.D.

## 2021-07-15 NOTE — OR NURSING
Report received from DEDE Clinton. Pt dressed, ambulated to bathroom, and PIV removed. No complaints of nausea or vomiting. Discharging home with . VSS.

## 2021-07-15 NOTE — OR NURSING
1711: pt arrived to PACU in stable condition with airway in place. VSS. 3 lap sites to left abd closed with dermabond are CDI.     1715: pt airway out and pt resting in bed.     1815: pt still very sleepy but arousable to voice. Pt states pain is mild, and dose not want any pain medication at this time. Pt has been updated on plan of care and all questions have been addressed. MD Tobias updated on pt status. Pt  called and updated.

## 2021-07-15 NOTE — ANESTHESIA POSTPROCEDURE EVALUATION
Patient: Adrianne Yanez    Procedure Summary     Date: 07/14/21 Room / Location: [facility] 11 / SURGERY [facility]    Anesthesia Start: 1604 Anesthesia Stop: 1713    Procedure: REPAIR, HERNIA, VENTRAL, ROBOT-ASSISTED, USING DA ADRIANE XI - FOR PERIUMBILICAL WITH MESH. (Abdomen) Diagnosis: (UMBILICAL HERNIA)    Surgeons: Mark Andrews M.D. Responsible Provider: Chacho Tobias M.D.    Anesthesia Type: general ASA Status: 2          Final Anesthesia Type: general  Last vitals  BP   Blood Pressure: 113/69    Temp   36.5 °C (97.7 °F)    Pulse   63   Resp   19    SpO2   96 %      Anesthesia Post Evaluation    Patient location during evaluation: PACU  Patient participation: complete - patient participated  Level of consciousness: awake and alert  Pain score: 0    Airway patency: patent  Anesthetic complications: no  Cardiovascular status: hemodynamically stable  Respiratory status: acceptable  Hydration status: euvolemic    PONV: none          No complications documented.     Nurse Pain Score: 0 (NPRS)

## 2021-09-04 ENCOUNTER — HOSPITAL ENCOUNTER (OUTPATIENT)
Dept: LAB | Facility: MEDICAL CENTER | Age: 41
End: 2021-09-04
Attending: FAMILY MEDICINE
Payer: COMMERCIAL

## 2021-09-04 LAB
25(OH)D3 SERPL-MCNC: 51 NG/ML (ref 30–100)
ALBUMIN SERPL BCP-MCNC: 4.4 G/DL (ref 3.2–4.9)
ALBUMIN/GLOB SERPL: 1.4 G/DL
ALP SERPL-CCNC: 81 U/L (ref 30–99)
ALT SERPL-CCNC: 35 U/L (ref 2–50)
ANION GAP SERPL CALC-SCNC: 12 MMOL/L (ref 7–16)
AST SERPL-CCNC: 29 U/L (ref 12–45)
BASOPHILS # BLD AUTO: 0.7 % (ref 0–1.8)
BASOPHILS # BLD: 0.07 K/UL (ref 0–0.12)
BILIRUB SERPL-MCNC: 1.3 MG/DL (ref 0.1–1.5)
BUN SERPL-MCNC: 13 MG/DL (ref 8–22)
CALCIUM SERPL-MCNC: 9.8 MG/DL (ref 8.5–10.5)
CHLORIDE SERPL-SCNC: 102 MMOL/L (ref 96–112)
CHOLEST SERPL-MCNC: 239 MG/DL (ref 100–199)
CO2 SERPL-SCNC: 24 MMOL/L (ref 20–33)
CREAT SERPL-MCNC: 0.49 MG/DL (ref 0.5–1.4)
CRP SERPL HS-MCNC: 4 MG/L (ref 0–3)
EOSINOPHIL # BLD AUTO: 0.28 K/UL (ref 0–0.51)
EOSINOPHIL NFR BLD: 2.9 % (ref 0–6.9)
ERYTHROCYTE [DISTWIDTH] IN BLOOD BY AUTOMATED COUNT: 44.5 FL (ref 35.9–50)
FASTING STATUS PATIENT QL REPORTED: NORMAL
FERRITIN SERPL-MCNC: 68.7 NG/ML (ref 10–291)
GLOBULIN SER CALC-MCNC: 3.1 G/DL (ref 1.9–3.5)
GLUCOSE SERPL-MCNC: 79 MG/DL (ref 65–99)
HCT VFR BLD AUTO: 43.2 % (ref 37–47)
HDLC SERPL-MCNC: 39 MG/DL
HGB BLD-MCNC: 13.4 G/DL (ref 12–16)
IMM GRANULOCYTES # BLD AUTO: 0.02 K/UL (ref 0–0.11)
IMM GRANULOCYTES NFR BLD AUTO: 0.2 % (ref 0–0.9)
IRON SATN MFR SERPL: 21 % (ref 15–55)
IRON SERPL-MCNC: 68 UG/DL (ref 40–170)
LDLC SERPL CALC-MCNC: 168 MG/DL
LYMPHOCYTES # BLD AUTO: 3.11 K/UL (ref 1–4.8)
LYMPHOCYTES NFR BLD: 32.5 % (ref 22–41)
MCH RBC QN AUTO: 27.5 PG (ref 27–33)
MCHC RBC AUTO-ENTMCNC: 31 G/DL (ref 33.6–35)
MCV RBC AUTO: 88.7 FL (ref 81.4–97.8)
MONOCYTES # BLD AUTO: 0.51 K/UL (ref 0–0.85)
MONOCYTES NFR BLD AUTO: 5.3 % (ref 0–13.4)
NEUTROPHILS # BLD AUTO: 5.59 K/UL (ref 2–7.15)
NEUTROPHILS NFR BLD: 58.4 % (ref 44–72)
NRBC # BLD AUTO: 0 K/UL
NRBC BLD-RTO: 0 /100 WBC
PLATELET # BLD AUTO: 249 K/UL (ref 164–446)
PMV BLD AUTO: 12.7 FL (ref 9–12.9)
POTASSIUM SERPL-SCNC: 4.5 MMOL/L (ref 3.6–5.5)
PROT SERPL-MCNC: 7.5 G/DL (ref 6–8.2)
RBC # BLD AUTO: 4.87 M/UL (ref 4.2–5.4)
SODIUM SERPL-SCNC: 138 MMOL/L (ref 135–145)
T3FREE SERPL-MCNC: 2.95 PG/ML (ref 2–4.4)
T4 FREE SERPL-MCNC: 1.08 NG/DL (ref 0.93–1.7)
TIBC SERPL-MCNC: 331 UG/DL (ref 250–450)
TRIGL SERPL-MCNC: 159 MG/DL (ref 0–149)
TSH SERPL DL<=0.005 MIU/L-ACNC: 1.39 UIU/ML (ref 0.38–5.33)
UIBC SERPL-MCNC: 263 UG/DL (ref 110–370)
WBC # BLD AUTO: 9.6 K/UL (ref 4.8–10.8)

## 2021-09-04 PROCEDURE — 82728 ASSAY OF FERRITIN: CPT

## 2021-09-04 PROCEDURE — 80061 LIPID PANEL: CPT

## 2021-09-04 PROCEDURE — 80053 COMPREHEN METABOLIC PANEL: CPT

## 2021-09-04 PROCEDURE — 86141 C-REACTIVE PROTEIN HS: CPT

## 2021-09-04 PROCEDURE — 85025 COMPLETE CBC W/AUTO DIFF WBC: CPT

## 2021-09-04 PROCEDURE — 36415 COLL VENOUS BLD VENIPUNCTURE: CPT

## 2021-09-04 PROCEDURE — 86800 THYROGLOBULIN ANTIBODY: CPT

## 2021-09-04 PROCEDURE — 83550 IRON BINDING TEST: CPT

## 2021-09-04 PROCEDURE — 84439 ASSAY OF FREE THYROXINE: CPT

## 2021-09-04 PROCEDURE — 82306 VITAMIN D 25 HYDROXY: CPT

## 2021-09-04 PROCEDURE — 84443 ASSAY THYROID STIM HORMONE: CPT

## 2021-09-04 PROCEDURE — 83540 ASSAY OF IRON: CPT

## 2021-09-04 PROCEDURE — 84481 FREE ASSAY (FT-3): CPT

## 2021-09-06 LAB — THYROGLOB AB SERPL-ACNC: <0.9 IU/ML (ref 0–4)

## 2021-09-16 ENCOUNTER — HOSPITAL ENCOUNTER (EMERGENCY)
Facility: MEDICAL CENTER | Age: 41
End: 2021-09-16
Attending: EMERGENCY MEDICINE
Payer: COMMERCIAL

## 2021-09-16 VITALS
BODY MASS INDEX: 34.2 KG/M2 | HEART RATE: 80 BPM | DIASTOLIC BLOOD PRESSURE: 80 MMHG | SYSTOLIC BLOOD PRESSURE: 110 MMHG | OXYGEN SATURATION: 98 % | RESPIRATION RATE: 16 BRPM | WEIGHT: 205.25 LBS | TEMPERATURE: 97.9 F | HEIGHT: 65 IN

## 2021-09-16 DIAGNOSIS — H10.31 ACUTE CONJUNCTIVITIS OF RIGHT EYE, UNSPECIFIED ACUTE CONJUNCTIVITIS TYPE: ICD-10-CM

## 2021-09-16 PROCEDURE — 99283 EMERGENCY DEPT VISIT LOW MDM: CPT

## 2021-09-16 PROCEDURE — 700101 HCHG RX REV CODE 250: Performed by: EMERGENCY MEDICINE

## 2021-09-16 RX ORDER — POLYMYXIN B SULFATE AND TRIMETHOPRIM 1; 10000 MG/ML; [USP'U]/ML
2 SOLUTION OPHTHALMIC EVERY 4 HOURS
Qty: 10 ML | Refills: 0 | Status: SHIPPED | OUTPATIENT
Start: 2021-09-16 | End: 2021-09-21

## 2021-09-16 RX ORDER — PROPARACAINE HYDROCHLORIDE 5 MG/ML
1 SOLUTION/ DROPS OPHTHALMIC ONCE
Status: COMPLETED | OUTPATIENT
Start: 2021-09-16 | End: 2021-09-16

## 2021-09-16 RX ADMIN — PROPARACAINE HYDROCHLORIDE 1 DROP: 5 SOLUTION/ DROPS OPHTHALMIC at 14:30

## 2021-09-16 RX ADMIN — FLUORESCEIN SODIUM 1 MG: 1 STRIP OPHTHALMIC at 14:30

## 2021-09-16 ASSESSMENT — FIBROSIS 4 INDEX: FIB4 SCORE: 0.77

## 2021-09-16 NOTE — ED PROVIDER NOTES
ED Provider Note    Scribed for Luzmaria Juarez M.D. by Maria C Greenberg. 9/16/2021  2:23 PM    Primary care provider: Anup Smith M.D.  Means of arrival: Walk in  History obtained from: Patient  History limited by: None     CHIEF COMPLAINT  Chief Complaint   Patient presents with    Eye Injury     noticed something in her right eye, she rubbed it and now it not sure what is going on. She did rinse it, eye lid puffy, incident occurred 2 hours ago.       HPI  Adrianne Yanez is a 41 y.o. female who presents to the Emergency Department for right eye pain with an onset of this morning. The patient noticed something in her right eye this morning. When she rubbed her eye, her symptoms worsened, and she began feeling more discomfort. She attempted to wash her right eye with saline solution and visine without any improvement. She reports associated right eyelid swelling. She denies any associated fever, chills, runny nose, vision changes, or allergies. Patient does not wear contacts or corrective glasses.     REVIEW OF SYSTEMS  EYES: right eye pain/discomfort, and eyelid swelling, no vision changes.   Endocrine: no fevers or chills    See history of present illness.     PAST MEDICAL HISTORY   has a past medical history of Anemia, Disorder of thyroid, Dyslipidemia, Fall (2017), High cholesterol, and Obesity (BMI 30-39.9).    SURGICAL HISTORY   has a past surgical history that includes primary c section (6/12/2013); repeat c section (1/7/2015); repeat c section (2/4/2017); repeat c section w tubal ligation (2/25/2019); and ventral hernia repair robotic xi (7/14/2021).    SOCIAL HISTORY  Social History     Tobacco Use    Smoking status: Never Smoker    Smokeless tobacco: Never Used   Vaping Use    Vaping Use: Never used   Substance Use Topics    Alcohol use: No     Alcohol/week: 0.0 oz    Drug use: No      Social History     Substance and Sexual Activity   Drug Use No       FAMILY HISTORY  Family History   Problem  "Relation Age of Onset    Heart Disease Father         CHF    Diabetes Brother     Diabetes Brother        CURRENT MEDICATIONS  Home Medications    **Home medications have not yet been reviewed for this encounter**       Current medications can be seen on the nurse's note.      ALLERGIES  No Known Allergies    PHYSICAL EXAM  VITAL SIGNS: /89   Pulse 82   Temp 36.6 °C (97.9 °F) (Temporal)   Resp 16   Ht 1.651 m (5' 5\")   Wt 93.1 kg (205 lb 4 oz)   LMP 09/09/2021   SpO2 96%   BMI 34.16 kg/m²     Constitutional: Well developed, Well nourished, No acute distress, Non-toxic appearance.   HEENT: Normocephalic, Atraumatic,  external ears normal, pharynx pink,  No rhinorrhea or mucosal edema  Eyes: PERRL, EOMI, Right eye: injected, periorbital edema, no erythema, no proptosis. Slit lamp exam showed no hyphema cell flair, no fluorescein streaming, no foreign body.   Lymphatic: No lymphadenopathy    Cardiovascular: Regular Rate and Rhythm, No murmurs,  rubs, or gallops.   Thorax & Lungs: Lungs clear to auscultation bilaterally, No respiratory distress, No wheezes, rhales or rhonchi, No chest wall tenderness.   Skin: Warm, Dry.   Neurologic: Alert & awake, no focal deficits  Psychiatric: Affect normal    COURSE & MEDICAL DECISION MAKING  Nursing notes, VS, PMSFHx reviewed in chart.     2:10 PM - Patient seen and examined at bedside. Discussed plan of care with the patient. I informed her I will do a slit lamp exam. She will be prescribed antibiotics to treat her symptoms and follow up with ophthalmology. Patient will be treated with Opthaine 0.5% solution, and fluorescein ophthalmic strip 1 mg. The patient will return for new or worsening symptoms and is stable at the time of discharge. Patient verbalizes understanding and agreement to this plan of care.      Patient has had high blood pressure while in the emergency department, felt likely secondary to medical condition. Counseled patient to monitor blood " pressure at home and follow up with primary care physician.     DISPOSITION:  Patient will be discharged home in stable condition.    FOLLOW UP:  Melanie Ortega M.D.  [address] [place] [id number]  [phone]    Call in 1 day  for recheck, to establish care      OUTPATIENT MEDICATIONS:  Discharge Medication List as of 9/16/2021  2:33 PM        START taking these medications    Details   polymixin-trimethoprim (POLYTRIM) 37750-6.1 UNIT/ML-% Solution Administer 2 Drops into the right eye every 4 hours for 5 days., Disp-10 mL, R-0, Normal             FINAL IMPRESSION  1. Acute conjunctivitis of right eye, unspecified acute conjunctivitis type          I, Maria C Greenberg (Scribe), am scribing for, and in the presence of, Luzmaria Juarez M.D..    Electronically signed by: Maria C Greenberg (Scribe), 9/16/2021    I, Luzmaria Juarez M.D. personally performed the services described in this documentation, as scribed by Maria C Greenberg in my presence, and it is both accurate and complete.    E    The note accurately reflects work and decisions made by me.  Luzmaria Juarez M.D.  9/16/2021  5:30 PM

## 2021-09-16 NOTE — ED TRIAGE NOTES
Chief Complaint   Patient presents with   • Eye Injury     noticed something in her right eye, she rubbed it and now it not sure what is going on. She did rinse it, eye lid puffy, incident occurred 2 hours ago.   The object feel like it is in her lower eye.

## 2021-09-16 NOTE — ED NOTES
Discharge instructions given and discussed.  Pt educated to come back to ER for new or worsening symptoms and follow up with opthalmology as instructed. Pt verbalized understanding. VSS. Pt  Discharged in stable condition.

## 2021-09-30 ENCOUNTER — HOSPITAL ENCOUNTER (OUTPATIENT)
Dept: LAB | Facility: MEDICAL CENTER | Age: 41
End: 2021-09-30
Attending: PHYSICIAN ASSISTANT
Payer: COMMERCIAL

## 2021-09-30 PROCEDURE — 88175 CYTOPATH C/V AUTO FLUID REDO: CPT

## 2021-10-01 LAB — CYTOLOGY REG CYTOL: NORMAL

## 2021-12-23 ENCOUNTER — HOSPITAL ENCOUNTER (OUTPATIENT)
Dept: LAB | Facility: MEDICAL CENTER | Age: 41
End: 2021-12-23
Attending: FAMILY MEDICINE
Payer: COMMERCIAL

## 2021-12-23 LAB
25(OH)D3 SERPL-MCNC: 63 NG/ML (ref 30–100)
ALBUMIN SERPL BCP-MCNC: 4.8 G/DL (ref 3.2–4.9)
ALBUMIN/GLOB SERPL: 1.7 G/DL
ALP SERPL-CCNC: 83 U/L (ref 30–99)
ALT SERPL-CCNC: 35 U/L (ref 2–50)
ANION GAP SERPL CALC-SCNC: 12 MMOL/L (ref 7–16)
AST SERPL-CCNC: 35 U/L (ref 12–45)
BASOPHILS # BLD AUTO: 0.7 % (ref 0–1.8)
BASOPHILS # BLD: 0.07 K/UL (ref 0–0.12)
BILIRUB SERPL-MCNC: 1.4 MG/DL (ref 0.1–1.5)
BUN SERPL-MCNC: 10 MG/DL (ref 8–22)
CALCIUM SERPL-MCNC: 9.5 MG/DL (ref 8.5–10.5)
CHLORIDE SERPL-SCNC: 103 MMOL/L (ref 96–112)
CHOLEST SERPL-MCNC: 233 MG/DL (ref 100–199)
CO2 SERPL-SCNC: 23 MMOL/L (ref 20–33)
CREAT SERPL-MCNC: 0.49 MG/DL (ref 0.5–1.4)
EOSINOPHIL # BLD AUTO: 0.28 K/UL (ref 0–0.51)
EOSINOPHIL NFR BLD: 2.6 % (ref 0–6.9)
ERYTHROCYTE [DISTWIDTH] IN BLOOD BY AUTOMATED COUNT: 42.5 FL (ref 35.9–50)
GLOBULIN SER CALC-MCNC: 2.9 G/DL (ref 1.9–3.5)
GLUCOSE SERPL-MCNC: 84 MG/DL (ref 65–99)
HCT VFR BLD AUTO: 43.1 % (ref 37–47)
HDLC SERPL-MCNC: 44 MG/DL
HGB BLD-MCNC: 13.5 G/DL (ref 12–16)
IMM GRANULOCYTES # BLD AUTO: 0.05 K/UL (ref 0–0.11)
IMM GRANULOCYTES NFR BLD AUTO: 0.5 % (ref 0–0.9)
LDLC SERPL CALC-MCNC: 164 MG/DL
LYMPHOCYTES # BLD AUTO: 3.64 K/UL (ref 1–4.8)
LYMPHOCYTES NFR BLD: 34 % (ref 22–41)
MCH RBC QN AUTO: 26.3 PG (ref 27–33)
MCHC RBC AUTO-ENTMCNC: 31.3 G/DL (ref 33.6–35)
MCV RBC AUTO: 83.9 FL (ref 81.4–97.8)
MONOCYTES # BLD AUTO: 0.65 K/UL (ref 0–0.85)
MONOCYTES NFR BLD AUTO: 6.1 % (ref 0–13.4)
NEUTROPHILS # BLD AUTO: 6.01 K/UL (ref 2–7.15)
NEUTROPHILS NFR BLD: 56.1 % (ref 44–72)
NRBC # BLD AUTO: 0 K/UL
NRBC BLD-RTO: 0 /100 WBC
PLATELET # BLD AUTO: 269 K/UL (ref 164–446)
PMV BLD AUTO: 12.3 FL (ref 9–12.9)
POTASSIUM SERPL-SCNC: 4.2 MMOL/L (ref 3.6–5.5)
PROT SERPL-MCNC: 7.7 G/DL (ref 6–8.2)
RBC # BLD AUTO: 5.14 M/UL (ref 4.2–5.4)
SODIUM SERPL-SCNC: 138 MMOL/L (ref 135–145)
T3 SERPL-MCNC: 109 NG/DL (ref 60–181)
T3FREE SERPL-MCNC: 3.34 PG/ML (ref 2–4.4)
T4 FREE SERPL-MCNC: 1.33 NG/DL (ref 0.93–1.7)
TRIGL SERPL-MCNC: 125 MG/DL (ref 0–149)
TSH SERPL DL<=0.005 MIU/L-ACNC: 0.94 UIU/ML (ref 0.38–5.33)
WBC # BLD AUTO: 10.7 K/UL (ref 4.8–10.8)

## 2021-12-23 PROCEDURE — 84481 FREE ASSAY (FT-3): CPT

## 2021-12-23 PROCEDURE — 85025 COMPLETE CBC W/AUTO DIFF WBC: CPT

## 2021-12-23 PROCEDURE — 80053 COMPREHEN METABOLIC PANEL: CPT

## 2021-12-23 PROCEDURE — 84480 ASSAY TRIIODOTHYRONINE (T3): CPT

## 2021-12-23 PROCEDURE — 36415 COLL VENOUS BLD VENIPUNCTURE: CPT

## 2021-12-23 PROCEDURE — 84443 ASSAY THYROID STIM HORMONE: CPT

## 2021-12-23 PROCEDURE — 82306 VITAMIN D 25 HYDROXY: CPT

## 2021-12-23 PROCEDURE — 80061 LIPID PANEL: CPT

## 2021-12-23 PROCEDURE — 84439 ASSAY OF FREE THYROXINE: CPT

## 2022-05-23 NOTE — PROGRESS NOTES
----- Message from Mickype Jyotsna sent at 5/23/2022  8:32 AM EDT -----  Subject: Message to Provider    QUESTIONS  Information for Provider? Patient Daughter Sudeep Dorsey called in in regards to   Ensure drinks from home health supply, states was told that Dr. Michael Reid does   not do this, she does not understand why and wants clarification ASAP.   ---------------------------------------------------------------------------  --------------  CALL BACK INFO  What is the best way for the office to contact you? OK to leave message on   voicemail  Preferred Call Back Phone Number? [phone]  ---------------------------------------------------------------------------  --------------  SCRIPT ANSWERS  Relationship to Patient? Guardian  Representative Name? micky Serna  Is the Representative on the appropriate HIPAA document in Epic?  Yes IUP at 34 5/7 weeks  HD #9    S:  Pt did not have any contractions overnight.  No spotting since yesterday afternoon.  Will monitor today.  If no contractions or bleeding - may be able to go home on bedrest  And Procardia SR 90 mg/day    O:  T 97.1 P 77  /69  ABD:  Gravid, NT  EXT: +1 pedal edema, no cords or homans  FHTs 140s Cat 1  [place]: occ  Cx - deferred    A/P;  IUP at 34 5/7 weeks,  contractions and possible small abruption - doing well  1.   contractions - Continue Nifedipine.  Did not have any contractions or VB over night. If no VB today, consider D/C home on bed rest  2.  FWB - reassuring  3. Prior C/S X 3 - plan for repeat C/S and BS  4.  On ASA.  S/P B-meth and was on Mag Sulfate until 19.   5.  GBS neg

## 2022-06-13 ENCOUNTER — HOSPITAL ENCOUNTER (OUTPATIENT)
Dept: LAB | Facility: MEDICAL CENTER | Age: 42
End: 2022-06-13
Attending: FAMILY MEDICINE
Payer: COMMERCIAL

## 2022-06-13 LAB
25(OH)D3 SERPL-MCNC: 63 NG/ML (ref 30–100)
ALBUMIN SERPL BCP-MCNC: 4.6 G/DL (ref 3.2–4.9)
ALBUMIN/GLOB SERPL: 1.5 G/DL
ALP SERPL-CCNC: 88 U/L (ref 30–99)
ALT SERPL-CCNC: 45 U/L (ref 2–50)
ANION GAP SERPL CALC-SCNC: 14 MMOL/L (ref 7–16)
AST SERPL-CCNC: 42 U/L (ref 12–45)
BILIRUB SERPL-MCNC: 0.9 MG/DL (ref 0.1–1.5)
BUN SERPL-MCNC: 11 MG/DL (ref 8–22)
CALCIUM SERPL-MCNC: 9.4 MG/DL (ref 8.5–10.5)
CHLORIDE SERPL-SCNC: 105 MMOL/L (ref 96–112)
CHOLEST SERPL-MCNC: 247 MG/DL (ref 100–199)
CO2 SERPL-SCNC: 21 MMOL/L (ref 20–33)
CREAT SERPL-MCNC: 0.53 MG/DL (ref 0.5–1.4)
FASTING STATUS PATIENT QL REPORTED: NORMAL
GFR SERPLBLD CREATININE-BSD FMLA CKD-EPI: 120 ML/MIN/1.73 M 2
GLOBULIN SER CALC-MCNC: 3.1 G/DL (ref 1.9–3.5)
GLUCOSE SERPL-MCNC: 83 MG/DL (ref 65–99)
HDLC SERPL-MCNC: 39 MG/DL
LDLC SERPL CALC-MCNC: 175 MG/DL
POTASSIUM SERPL-SCNC: 4.4 MMOL/L (ref 3.6–5.5)
PROT SERPL-MCNC: 7.7 G/DL (ref 6–8.2)
SODIUM SERPL-SCNC: 140 MMOL/L (ref 135–145)
T3FREE SERPL-MCNC: 2.96 PG/ML (ref 2–4.4)
T4 FREE SERPL-MCNC: 1.34 NG/DL (ref 0.93–1.7)
TRIGL SERPL-MCNC: 166 MG/DL (ref 0–149)
TSH SERPL DL<=0.005 MIU/L-ACNC: 1 UIU/ML (ref 0.38–5.33)

## 2022-06-13 PROCEDURE — 80053 COMPREHEN METABOLIC PANEL: CPT

## 2022-06-13 PROCEDURE — 36415 COLL VENOUS BLD VENIPUNCTURE: CPT

## 2022-06-13 PROCEDURE — 85025 COMPLETE CBC W/AUTO DIFF WBC: CPT

## 2022-06-13 PROCEDURE — 80061 LIPID PANEL: CPT

## 2022-06-13 PROCEDURE — 86800 THYROGLOBULIN ANTIBODY: CPT

## 2022-06-13 PROCEDURE — 84439 ASSAY OF FREE THYROXINE: CPT

## 2022-06-13 PROCEDURE — 82306 VITAMIN D 25 HYDROXY: CPT

## 2022-06-13 PROCEDURE — 84443 ASSAY THYROID STIM HORMONE: CPT

## 2022-06-13 PROCEDURE — 84481 FREE ASSAY (FT-3): CPT

## 2022-06-14 LAB
BASOPHILS # BLD AUTO: 0.7 % (ref 0–1.8)
BASOPHILS # BLD: 0.07 K/UL (ref 0–0.12)
EOSINOPHIL # BLD AUTO: 0.45 K/UL (ref 0–0.51)
EOSINOPHIL NFR BLD: 4.8 % (ref 0–6.9)
ERYTHROCYTE [DISTWIDTH] IN BLOOD BY AUTOMATED COUNT: 43.7 FL (ref 35.9–50)
HCT VFR BLD AUTO: 41.3 % (ref 37–47)
HGB BLD-MCNC: 12.7 G/DL (ref 12–16)
IMM GRANULOCYTES # BLD AUTO: 0.02 K/UL (ref 0–0.11)
IMM GRANULOCYTES NFR BLD AUTO: 0.2 % (ref 0–0.9)
LYMPHOCYTES # BLD AUTO: 3.36 K/UL (ref 1–4.8)
LYMPHOCYTES NFR BLD: 35.5 % (ref 22–41)
MCH RBC QN AUTO: 25 PG (ref 27–33)
MCHC RBC AUTO-ENTMCNC: 30.8 G/DL (ref 33.6–35)
MCV RBC AUTO: 81.5 FL (ref 81.4–97.8)
MONOCYTES # BLD AUTO: 0.47 K/UL (ref 0–0.85)
MONOCYTES NFR BLD AUTO: 5 % (ref 0–13.4)
NEUTROPHILS # BLD AUTO: 5.1 K/UL (ref 2–7.15)
NEUTROPHILS NFR BLD: 53.8 % (ref 44–72)
NRBC # BLD AUTO: 0 K/UL
NRBC BLD-RTO: 0 /100 WBC
PLATELET # BLD AUTO: 307 K/UL (ref 164–446)
PMV BLD AUTO: 12.9 FL (ref 9–12.9)
RBC # BLD AUTO: 5.07 M/UL (ref 4.2–5.4)
WBC # BLD AUTO: 9.5 K/UL (ref 4.8–10.8)

## 2022-06-15 LAB — THYROGLOB AB SERPL-ACNC: <0.9 IU/ML (ref 0–4)

## 2022-12-10 ENCOUNTER — HOSPITAL ENCOUNTER (OUTPATIENT)
Dept: LAB | Facility: MEDICAL CENTER | Age: 42
End: 2022-12-10
Attending: STUDENT IN AN ORGANIZED HEALTH CARE EDUCATION/TRAINING PROGRAM
Payer: COMMERCIAL

## 2022-12-10 LAB
25(OH)D3 SERPL-MCNC: 59 NG/ML (ref 30–100)
ALBUMIN SERPL BCP-MCNC: 4.4 G/DL (ref 3.2–4.9)
ALBUMIN/GLOB SERPL: 1.4 G/DL
ALP SERPL-CCNC: 85 U/L (ref 30–99)
ALT SERPL-CCNC: 48 U/L (ref 2–50)
ANION GAP SERPL CALC-SCNC: 11 MMOL/L (ref 7–16)
AST SERPL-CCNC: 43 U/L (ref 12–45)
BASOPHILS # BLD AUTO: 0.4 % (ref 0–1.8)
BASOPHILS # BLD: 0.04 K/UL (ref 0–0.12)
BILIRUB SERPL-MCNC: 1 MG/DL (ref 0.1–1.5)
BUN SERPL-MCNC: 15 MG/DL (ref 8–22)
CALCIUM SERPL-MCNC: 9.4 MG/DL (ref 8.5–10.5)
CHLORIDE SERPL-SCNC: 104 MMOL/L (ref 96–112)
CHOLEST SERPL-MCNC: 217 MG/DL (ref 100–199)
CO2 SERPL-SCNC: 23 MMOL/L (ref 20–33)
CREAT SERPL-MCNC: 0.55 MG/DL (ref 0.5–1.4)
EOSINOPHIL # BLD AUTO: 0.29 K/UL (ref 0–0.51)
EOSINOPHIL NFR BLD: 3 % (ref 0–6.9)
ERYTHROCYTE [DISTWIDTH] IN BLOOD BY AUTOMATED COUNT: 43.8 FL (ref 35.9–50)
FERRITIN SERPL-MCNC: 25.3 NG/ML (ref 10–291)
GFR SERPLBLD CREATININE-BSD FMLA CKD-EPI: 119 ML/MIN/1.73 M 2
GLOBULIN SER CALC-MCNC: 3.2 G/DL (ref 1.9–3.5)
GLUCOSE SERPL-MCNC: 78 MG/DL (ref 65–99)
HCT VFR BLD AUTO: 38.5 % (ref 37–47)
HDLC SERPL-MCNC: 37 MG/DL
HGB BLD-MCNC: 11.9 G/DL (ref 12–16)
IMM GRANULOCYTES # BLD AUTO: 0.05 K/UL (ref 0–0.11)
IMM GRANULOCYTES NFR BLD AUTO: 0.5 % (ref 0–0.9)
IRON SATN MFR SERPL: 8 % (ref 15–55)
IRON SERPL-MCNC: 30 UG/DL (ref 40–170)
LDLC SERPL CALC-MCNC: 156 MG/DL
LYMPHOCYTES # BLD AUTO: 3.2 K/UL (ref 1–4.8)
LYMPHOCYTES NFR BLD: 32.6 % (ref 22–41)
MCH RBC QN AUTO: 24.1 PG (ref 27–33)
MCHC RBC AUTO-ENTMCNC: 30.9 G/DL (ref 33.6–35)
MCV RBC AUTO: 77.9 FL (ref 81.4–97.8)
MONOCYTES # BLD AUTO: 0.49 K/UL (ref 0–0.85)
MONOCYTES NFR BLD AUTO: 5 % (ref 0–13.4)
NEUTROPHILS # BLD AUTO: 5.75 K/UL (ref 2–7.15)
NEUTROPHILS NFR BLD: 58.5 % (ref 44–72)
NRBC # BLD AUTO: 0 K/UL
NRBC BLD-RTO: 0 /100 WBC
PLATELET # BLD AUTO: 276 K/UL (ref 164–446)
PMV BLD AUTO: 12.5 FL (ref 9–12.9)
POTASSIUM SERPL-SCNC: 4 MMOL/L (ref 3.6–5.5)
PROT SERPL-MCNC: 7.6 G/DL (ref 6–8.2)
RBC # BLD AUTO: 4.94 M/UL (ref 4.2–5.4)
SODIUM SERPL-SCNC: 138 MMOL/L (ref 135–145)
T3FREE SERPL-MCNC: 2.75 PG/ML (ref 2–4.4)
T4 FREE SERPL-MCNC: 1.29 NG/DL (ref 0.93–1.7)
TIBC SERPL-MCNC: 371 UG/DL (ref 250–450)
TRANSFERRIN SERPL-MCNC: 314 MG/DL (ref 200–370)
TRIGL SERPL-MCNC: 120 MG/DL (ref 0–149)
TSH SERPL DL<=0.005 MIU/L-ACNC: 1.51 UIU/ML (ref 0.38–5.33)
UIBC SERPL-MCNC: 341 UG/DL (ref 110–370)
WBC # BLD AUTO: 9.8 K/UL (ref 4.8–10.8)

## 2022-12-10 PROCEDURE — 82728 ASSAY OF FERRITIN: CPT

## 2022-12-10 PROCEDURE — 84443 ASSAY THYROID STIM HORMONE: CPT

## 2022-12-10 PROCEDURE — 82306 VITAMIN D 25 HYDROXY: CPT

## 2022-12-10 PROCEDURE — 84439 ASSAY OF FREE THYROXINE: CPT

## 2022-12-10 PROCEDURE — 84481 FREE ASSAY (FT-3): CPT

## 2022-12-10 PROCEDURE — 36415 COLL VENOUS BLD VENIPUNCTURE: CPT

## 2022-12-10 PROCEDURE — 83550 IRON BINDING TEST: CPT

## 2022-12-10 PROCEDURE — 83540 ASSAY OF IRON: CPT

## 2022-12-10 PROCEDURE — 84466 ASSAY OF TRANSFERRIN: CPT

## 2022-12-10 PROCEDURE — 86800 THYROGLOBULIN ANTIBODY: CPT

## 2022-12-10 PROCEDURE — 80061 LIPID PANEL: CPT

## 2022-12-10 PROCEDURE — 85025 COMPLETE CBC W/AUTO DIFF WBC: CPT

## 2022-12-10 PROCEDURE — 80053 COMPREHEN METABOLIC PANEL: CPT

## 2022-12-13 LAB — THYROGLOB AB SERPL-ACNC: <0.9 IU/ML (ref 0–4)

## 2024-01-20 ENCOUNTER — HOSPITAL ENCOUNTER (OUTPATIENT)
Dept: LAB | Facility: MEDICAL CENTER | Age: 44
End: 2024-01-20
Attending: STUDENT IN AN ORGANIZED HEALTH CARE EDUCATION/TRAINING PROGRAM
Payer: COMMERCIAL

## 2024-01-20 LAB
25(OH)D3 SERPL-MCNC: 53 NG/ML (ref 30–100)
ALBUMIN SERPL BCP-MCNC: 4 G/DL (ref 3.2–4.9)
ALBUMIN/GLOB SERPL: 1.3 G/DL
ALP SERPL-CCNC: 90 U/L (ref 30–99)
ALT SERPL-CCNC: 45 U/L (ref 2–50)
ANION GAP SERPL CALC-SCNC: 10 MMOL/L (ref 7–16)
ANISOCYTOSIS BLD QL SMEAR: ABNORMAL
AST SERPL-CCNC: 41 U/L (ref 12–45)
BASOPHILS # BLD AUTO: 0.5 % (ref 0–1.8)
BASOPHILS # BLD: 0.05 K/UL (ref 0–0.12)
BILIRUB SERPL-MCNC: 1 MG/DL (ref 0.1–1.5)
BUN SERPL-MCNC: 9 MG/DL (ref 8–22)
BURR CELLS BLD QL SMEAR: NORMAL
CALCIUM ALBUM COR SERPL-MCNC: 8.9 MG/DL (ref 8.5–10.5)
CALCIUM SERPL-MCNC: 8.9 MG/DL (ref 8.5–10.5)
CHLORIDE SERPL-SCNC: 103 MMOL/L (ref 96–112)
CHOLEST SERPL-MCNC: 196 MG/DL (ref 100–199)
CO2 SERPL-SCNC: 21 MMOL/L (ref 20–33)
COMMENT 1642: NORMAL
CREAT SERPL-MCNC: 0.45 MG/DL (ref 0.5–1.4)
EOSINOPHIL # BLD AUTO: 0.25 K/UL (ref 0–0.51)
EOSINOPHIL NFR BLD: 2.6 % (ref 0–6.9)
ERYTHROCYTE [DISTWIDTH] IN BLOOD BY AUTOMATED COUNT: 42 FL (ref 35.9–50)
FASTING STATUS PATIENT QL REPORTED: NORMAL
GFR SERPLBLD CREATININE-BSD FMLA CKD-EPI: 124 ML/MIN/1.73 M 2
GLOBULIN SER CALC-MCNC: 3 G/DL (ref 1.9–3.5)
GLUCOSE SERPL-MCNC: 77 MG/DL (ref 65–99)
HCT VFR BLD AUTO: 37.6 % (ref 37–47)
HDLC SERPL-MCNC: 33 MG/DL
HGB BLD-MCNC: 11.2 G/DL (ref 12–16)
IMM GRANULOCYTES # BLD AUTO: 0.05 K/UL (ref 0–0.11)
IMM GRANULOCYTES NFR BLD AUTO: 0.5 % (ref 0–0.9)
LDLC SERPL CALC-MCNC: 135 MG/DL
LYMPHOCYTES # BLD AUTO: 2.77 K/UL (ref 1–4.8)
LYMPHOCYTES NFR BLD: 29.1 % (ref 22–41)
MCH RBC QN AUTO: 23.2 PG (ref 27–33)
MCHC RBC AUTO-ENTMCNC: 29.8 G/DL (ref 32.2–35.5)
MCV RBC AUTO: 78 FL (ref 81.4–97.8)
MICROCYTES BLD QL SMEAR: ABNORMAL
MONOCYTES # BLD AUTO: 0.49 K/UL (ref 0–0.85)
MONOCYTES NFR BLD AUTO: 5.1 % (ref 0–13.4)
MORPHOLOGY BLD-IMP: NORMAL
NEUTROPHILS # BLD AUTO: 5.91 K/UL (ref 1.82–7.42)
NEUTROPHILS NFR BLD: 62.2 % (ref 44–72)
NRBC # BLD AUTO: 0 K/UL
NRBC BLD-RTO: 0 /100 WBC (ref 0–0.2)
OVALOCYTES BLD QL SMEAR: NORMAL
PLATELET # BLD AUTO: 274 K/UL (ref 164–446)
PLATELET BLD QL SMEAR: NORMAL
PMV BLD AUTO: 12.9 FL (ref 9–12.9)
POIKILOCYTOSIS BLD QL SMEAR: NORMAL
POTASSIUM SERPL-SCNC: 4.3 MMOL/L (ref 3.6–5.5)
PROT SERPL-MCNC: 7 G/DL (ref 6–8.2)
RBC # BLD AUTO: 4.82 M/UL (ref 4.2–5.4)
RBC BLD AUTO: PRESENT
SODIUM SERPL-SCNC: 134 MMOL/L (ref 135–145)
T3FREE SERPL-MCNC: 2.91 PG/ML (ref 2–4.4)
T4 FREE SERPL-MCNC: 1.31 NG/DL (ref 0.93–1.7)
THYROPEROXIDASE AB SERPL-ACNC: 9 IU/ML (ref 0–9)
TRIGL SERPL-MCNC: 140 MG/DL (ref 0–149)
TSH SERPL DL<=0.005 MIU/L-ACNC: 1.26 UIU/ML (ref 0.38–5.33)
WBC # BLD AUTO: 9.5 K/UL (ref 4.8–10.8)

## 2024-01-20 PROCEDURE — 85025 COMPLETE CBC W/AUTO DIFF WBC: CPT

## 2024-01-20 PROCEDURE — 86376 MICROSOMAL ANTIBODY EACH: CPT

## 2024-01-20 PROCEDURE — 84439 ASSAY OF FREE THYROXINE: CPT

## 2024-01-20 PROCEDURE — 84443 ASSAY THYROID STIM HORMONE: CPT

## 2024-01-20 PROCEDURE — 80061 LIPID PANEL: CPT

## 2024-01-20 PROCEDURE — 80053 COMPREHEN METABOLIC PANEL: CPT

## 2024-01-20 PROCEDURE — 86800 THYROGLOBULIN ANTIBODY: CPT

## 2024-01-20 PROCEDURE — 36415 COLL VENOUS BLD VENIPUNCTURE: CPT

## 2024-01-20 PROCEDURE — 84481 FREE ASSAY (FT-3): CPT

## 2024-01-20 PROCEDURE — 82306 VITAMIN D 25 HYDROXY: CPT

## 2024-01-23 LAB
THYROGLOB AB SERPL-ACNC: <0.9 IU/ML (ref 0–4)
THYROPEROXIDASE AB SERPL-ACNC: 0.6 IU/ML (ref 0–9)

## 2024-07-19 ENCOUNTER — HOSPITAL ENCOUNTER (OUTPATIENT)
Dept: LAB | Facility: MEDICAL CENTER | Age: 44
End: 2024-07-19
Attending: STUDENT IN AN ORGANIZED HEALTH CARE EDUCATION/TRAINING PROGRAM
Payer: COMMERCIAL

## 2024-07-19 LAB
25(OH)D3 SERPL-MCNC: 34 NG/ML (ref 30–100)
ALBUMIN SERPL BCP-MCNC: 4.5 G/DL (ref 3.2–4.9)
ALBUMIN/GLOB SERPL: 1.6 G/DL
ALP SERPL-CCNC: 90 U/L (ref 30–99)
ALT SERPL-CCNC: 46 U/L (ref 2–50)
ANION GAP SERPL CALC-SCNC: 13 MMOL/L (ref 7–16)
AST SERPL-CCNC: 45 U/L (ref 12–45)
BASOPHILS # BLD AUTO: 0.6 % (ref 0–1.8)
BASOPHILS # BLD: 0.06 K/UL (ref 0–0.12)
BILIRUB SERPL-MCNC: 1 MG/DL (ref 0.1–1.5)
BUN SERPL-MCNC: 12 MG/DL (ref 8–22)
CALCIUM ALBUM COR SERPL-MCNC: 9.6 MG/DL (ref 8.5–10.5)
CALCIUM SERPL-MCNC: 10 MG/DL (ref 8.5–10.5)
CHLORIDE SERPL-SCNC: 104 MMOL/L (ref 96–112)
CHOLEST SERPL-MCNC: 251 MG/DL (ref 100–199)
CO2 SERPL-SCNC: 22 MMOL/L (ref 20–33)
CREAT SERPL-MCNC: 0.55 MG/DL (ref 0.5–1.4)
EOSINOPHIL # BLD AUTO: 0.23 K/UL (ref 0–0.51)
EOSINOPHIL NFR BLD: 2.5 % (ref 0–6.9)
ERYTHROCYTE [DISTWIDTH] IN BLOOD BY AUTOMATED COUNT: 42.5 FL (ref 35.9–50)
GFR SERPLBLD CREATININE-BSD FMLA CKD-EPI: 117 ML/MIN/1.73 M 2
GLOBULIN SER CALC-MCNC: 2.8 G/DL (ref 1.9–3.5)
GLUCOSE SERPL-MCNC: 86 MG/DL (ref 65–99)
HCT VFR BLD AUTO: 38.1 % (ref 37–47)
HDLC SERPL-MCNC: 41 MG/DL
HGB BLD-MCNC: 11.9 G/DL (ref 12–16)
IMM GRANULOCYTES # BLD AUTO: 0.04 K/UL (ref 0–0.11)
IMM GRANULOCYTES NFR BLD AUTO: 0.4 % (ref 0–0.9)
LDLC SERPL CALC-MCNC: 179 MG/DL
LYMPHOCYTES # BLD AUTO: 3.43 K/UL (ref 1–4.8)
LYMPHOCYTES NFR BLD: 36.6 % (ref 22–41)
MCH RBC QN AUTO: 24.1 PG (ref 27–33)
MCHC RBC AUTO-ENTMCNC: 31.2 G/DL (ref 32.2–35.5)
MCV RBC AUTO: 77.3 FL (ref 81.4–97.8)
MONOCYTES # BLD AUTO: 0.57 K/UL (ref 0–0.85)
MONOCYTES NFR BLD AUTO: 6.1 % (ref 0–13.4)
NEUTROPHILS # BLD AUTO: 5.05 K/UL (ref 1.82–7.42)
NEUTROPHILS NFR BLD: 53.8 % (ref 44–72)
NRBC # BLD AUTO: 0 K/UL
NRBC BLD-RTO: 0 /100 WBC (ref 0–0.2)
PLATELET # BLD AUTO: 280 K/UL (ref 164–446)
PMV BLD AUTO: 12.9 FL (ref 9–12.9)
POTASSIUM SERPL-SCNC: 4.6 MMOL/L (ref 3.6–5.5)
PROT SERPL-MCNC: 7.3 G/DL (ref 6–8.2)
RBC # BLD AUTO: 4.93 M/UL (ref 4.2–5.4)
SODIUM SERPL-SCNC: 139 MMOL/L (ref 135–145)
T3FREE SERPL-MCNC: 2.84 PG/ML (ref 2–4.4)
T4 FREE SERPL-MCNC: 1.28 NG/DL (ref 0.93–1.7)
TRIGL SERPL-MCNC: 157 MG/DL (ref 0–149)
TSH SERPL-ACNC: 1.08 UIU/ML (ref 0.35–5.5)
WBC # BLD AUTO: 9.4 K/UL (ref 4.8–10.8)

## 2024-07-19 PROCEDURE — 82306 VITAMIN D 25 HYDROXY: CPT

## 2024-07-19 PROCEDURE — 36415 COLL VENOUS BLD VENIPUNCTURE: CPT

## 2024-07-19 PROCEDURE — 84481 FREE ASSAY (FT-3): CPT

## 2024-07-19 PROCEDURE — 84443 ASSAY THYROID STIM HORMONE: CPT

## 2024-07-19 PROCEDURE — 84439 ASSAY OF FREE THYROXINE: CPT

## 2024-07-19 PROCEDURE — 86376 MICROSOMAL ANTIBODY EACH: CPT

## 2024-07-19 PROCEDURE — 86800 THYROGLOBULIN ANTIBODY: CPT

## 2024-07-19 PROCEDURE — 80053 COMPREHEN METABOLIC PANEL: CPT

## 2024-07-19 PROCEDURE — 85025 COMPLETE CBC W/AUTO DIFF WBC: CPT

## 2024-07-19 PROCEDURE — 80061 LIPID PANEL: CPT

## 2024-07-24 LAB
THYROGLOB AB SERPL-ACNC: <0.9 IU/ML (ref 0–4)
THYROPEROXIDASE AB SERPL-ACNC: 0.7 IU/ML (ref 0–9)

## 2024-10-08 ENCOUNTER — OFFICE VISIT (OUTPATIENT)
Dept: URGENT CARE | Facility: PHYSICIAN GROUP | Age: 44
End: 2024-10-08
Payer: COMMERCIAL

## 2024-10-08 VITALS
TEMPERATURE: 98.1 F | HEART RATE: 71 BPM | RESPIRATION RATE: 16 BRPM | OXYGEN SATURATION: 98 % | WEIGHT: 205 LBS | HEIGHT: 65 IN | SYSTOLIC BLOOD PRESSURE: 118 MMHG | BODY MASS INDEX: 34.16 KG/M2 | DIASTOLIC BLOOD PRESSURE: 80 MMHG

## 2024-10-08 DIAGNOSIS — K64.4 EXTERNAL HEMORRHOID: ICD-10-CM

## 2024-10-08 PROCEDURE — 3079F DIAST BP 80-89 MM HG: CPT | Performed by: PHYSICIAN ASSISTANT

## 2024-10-08 PROCEDURE — 3074F SYST BP LT 130 MM HG: CPT | Performed by: PHYSICIAN ASSISTANT

## 2024-10-08 PROCEDURE — 99203 OFFICE O/P NEW LOW 30 MIN: CPT | Performed by: PHYSICIAN ASSISTANT

## 2024-10-08 RX ORDER — LEVOTHYROXINE SODIUM 75 UG/1
75 TABLET ORAL
COMMUNITY
Start: 2024-07-22

## 2024-10-08 RX ORDER — HYDROCORTISONE 25 MG/G
1 CREAM TOPICAL DAILY
Qty: 30 G | Refills: 0 | Status: SHIPPED | OUTPATIENT
Start: 2024-10-08

## 2024-10-08 ASSESSMENT — ENCOUNTER SYMPTOMS
ABDOMINAL PAIN: 0
NAUSEA: 0
CONSTIPATION: 0
VOMITING: 0
ROS GI COMMENTS: EXTERNAL HEMORRHOID
DIARRHEA: 0
BLOOD IN STOOL: 1

## 2024-10-08 ASSESSMENT — FIBROSIS 4 INDEX: FIB4 SCORE: 1

## 2025-01-20 ENCOUNTER — HOSPITAL ENCOUNTER (OUTPATIENT)
Dept: LAB | Facility: MEDICAL CENTER | Age: 45
End: 2025-01-20
Attending: STUDENT IN AN ORGANIZED HEALTH CARE EDUCATION/TRAINING PROGRAM
Payer: COMMERCIAL

## 2025-01-20 LAB
25(OH)D3 SERPL-MCNC: 30 NG/ML (ref 30–100)
ALBUMIN SERPL BCP-MCNC: 4 G/DL (ref 3.2–4.9)
ALBUMIN/GLOB SERPL: 1.3 G/DL
ALP SERPL-CCNC: 83 U/L (ref 30–99)
ALT SERPL-CCNC: 143 U/L (ref 2–50)
ANION GAP SERPL CALC-SCNC: 12 MMOL/L (ref 7–16)
AST SERPL-CCNC: 91 U/L (ref 12–45)
BASOPHILS # BLD AUTO: 0.5 % (ref 0–1.8)
BASOPHILS # BLD: 0.03 K/UL (ref 0–0.12)
BILIRUB SERPL-MCNC: 0.5 MG/DL (ref 0.1–1.5)
BUN SERPL-MCNC: 10 MG/DL (ref 8–22)
CALCIUM ALBUM COR SERPL-MCNC: 9.1 MG/DL (ref 8.5–10.5)
CALCIUM SERPL-MCNC: 9.1 MG/DL (ref 8.5–10.5)
CHLORIDE SERPL-SCNC: 102 MMOL/L (ref 96–112)
CHOLEST SERPL-MCNC: 192 MG/DL (ref 100–199)
CO2 SERPL-SCNC: 25 MMOL/L (ref 20–33)
CREAT SERPL-MCNC: 0.6 MG/DL (ref 0.5–1.4)
EOSINOPHIL # BLD AUTO: 0.2 K/UL (ref 0–0.51)
EOSINOPHIL NFR BLD: 3.2 % (ref 0–6.9)
ERYTHROCYTE [DISTWIDTH] IN BLOOD BY AUTOMATED COUNT: 46.5 FL (ref 35.9–50)
FERRITIN SERPL-MCNC: 58.5 NG/ML (ref 10–291)
GFR SERPLBLD CREATININE-BSD FMLA CKD-EPI: 115 ML/MIN/1.73 M 2
GLOBULIN SER CALC-MCNC: 3 G/DL (ref 1.9–3.5)
GLUCOSE SERPL-MCNC: 84 MG/DL (ref 65–99)
HCT VFR BLD AUTO: 37.5 % (ref 37–47)
HDLC SERPL-MCNC: 27 MG/DL
HGB BLD-MCNC: 11.4 G/DL (ref 12–16)
IMM GRANULOCYTES # BLD AUTO: 0.01 K/UL (ref 0–0.11)
IMM GRANULOCYTES NFR BLD AUTO: 0.2 % (ref 0–0.9)
IRON SATN MFR SERPL: 6 % (ref 15–55)
IRON SERPL-MCNC: 21 UG/DL (ref 40–170)
LDLC SERPL CALC-MCNC: 135 MG/DL
LYMPHOCYTES # BLD AUTO: 2.65 K/UL (ref 1–4.8)
LYMPHOCYTES NFR BLD: 42.4 % (ref 22–41)
MCH RBC QN AUTO: 23.5 PG (ref 27–33)
MCHC RBC AUTO-ENTMCNC: 30.4 G/DL (ref 32.2–35.5)
MCV RBC AUTO: 77.3 FL (ref 81.4–97.8)
MONOCYTES # BLD AUTO: 0.34 K/UL (ref 0–0.85)
MONOCYTES NFR BLD AUTO: 5.4 % (ref 0–13.4)
NEUTROPHILS # BLD AUTO: 3.02 K/UL (ref 1.82–7.42)
NEUTROPHILS NFR BLD: 48.3 % (ref 44–72)
NRBC # BLD AUTO: 0 K/UL
NRBC BLD-RTO: 0 /100 WBC (ref 0–0.2)
PLATELET # BLD AUTO: 232 K/UL (ref 164–446)
PMV BLD AUTO: 12.8 FL (ref 9–12.9)
POTASSIUM SERPL-SCNC: 4.2 MMOL/L (ref 3.6–5.5)
PROT SERPL-MCNC: 7 G/DL (ref 6–8.2)
RBC # BLD AUTO: 4.85 M/UL (ref 4.2–5.4)
SODIUM SERPL-SCNC: 139 MMOL/L (ref 135–145)
T3FREE SERPL-MCNC: 3.07 PG/ML (ref 2–4.4)
T4 FREE SERPL-MCNC: 1.36 NG/DL (ref 0.93–1.7)
TIBC SERPL-MCNC: 332 UG/DL (ref 250–450)
TRIGL SERPL-MCNC: 148 MG/DL (ref 0–149)
TSH SERPL-ACNC: 1.35 UIU/ML (ref 0.35–5.5)
UIBC SERPL-MCNC: 311 UG/DL (ref 110–370)
WBC # BLD AUTO: 6.3 K/UL (ref 4.8–10.8)

## 2025-01-20 PROCEDURE — 80053 COMPREHEN METABOLIC PANEL: CPT

## 2025-01-20 PROCEDURE — 82728 ASSAY OF FERRITIN: CPT

## 2025-01-20 PROCEDURE — 83550 IRON BINDING TEST: CPT

## 2025-01-20 PROCEDURE — 84439 ASSAY OF FREE THYROXINE: CPT

## 2025-01-20 PROCEDURE — 82306 VITAMIN D 25 HYDROXY: CPT

## 2025-01-20 PROCEDURE — 83540 ASSAY OF IRON: CPT

## 2025-01-20 PROCEDURE — 84481 FREE ASSAY (FT-3): CPT

## 2025-01-20 PROCEDURE — 80061 LIPID PANEL: CPT

## 2025-01-20 PROCEDURE — 84443 ASSAY THYROID STIM HORMONE: CPT

## 2025-01-20 PROCEDURE — 36415 COLL VENOUS BLD VENIPUNCTURE: CPT

## 2025-01-20 PROCEDURE — 85025 COMPLETE CBC W/AUTO DIFF WBC: CPT

## 2025-07-16 ENCOUNTER — HOSPITAL ENCOUNTER (OUTPATIENT)
Dept: LAB | Facility: MEDICAL CENTER | Age: 45
End: 2025-07-16
Attending: STUDENT IN AN ORGANIZED HEALTH CARE EDUCATION/TRAINING PROGRAM
Payer: COMMERCIAL

## 2025-07-16 LAB
25(OH)D3 SERPL-MCNC: 28 NG/ML (ref 30–100)
ALBUMIN SERPL BCP-MCNC: 4.1 G/DL (ref 3.2–4.9)
ALBUMIN/GLOB SERPL: 1.2 G/DL
ALP SERPL-CCNC: 88 U/L (ref 30–99)
ALT SERPL-CCNC: 32 U/L (ref 2–50)
ANION GAP SERPL CALC-SCNC: 11 MMOL/L (ref 7–16)
ANISOCYTOSIS BLD QL SMEAR: ABNORMAL
AST SERPL-CCNC: 32 U/L (ref 12–45)
BASOPHILS # BLD AUTO: 0.7 % (ref 0–1.8)
BASOPHILS # BLD: 0.06 K/UL (ref 0–0.12)
BILIRUB SERPL-MCNC: 0.9 MG/DL (ref 0.1–1.5)
BUN SERPL-MCNC: 11 MG/DL (ref 8–22)
CALCIUM ALBUM COR SERPL-MCNC: 9.1 MG/DL (ref 8.5–10.5)
CALCIUM SERPL-MCNC: 9.2 MG/DL (ref 8.5–10.5)
CHLORIDE SERPL-SCNC: 105 MMOL/L (ref 96–112)
CHOLEST SERPL-MCNC: 233 MG/DL (ref 100–199)
CO2 SERPL-SCNC: 22 MMOL/L (ref 20–33)
COMMENT 1642: NORMAL
CREAT SERPL-MCNC: 0.68 MG/DL (ref 0.5–1.4)
EOSINOPHIL # BLD AUTO: 0.63 K/UL (ref 0–0.51)
EOSINOPHIL NFR BLD: 7.8 % (ref 0–6.9)
ERYTHROCYTE [DISTWIDTH] IN BLOOD BY AUTOMATED COUNT: 46 FL (ref 35.9–50)
FERRITIN SERPL-MCNC: 23.8 NG/ML (ref 10–291)
GFR SERPLBLD CREATININE-BSD FMLA CKD-EPI: 111 ML/MIN/1.73 M 2
GLOBULIN SER CALC-MCNC: 3.3 G/DL (ref 1.9–3.5)
GLUCOSE SERPL-MCNC: 80 MG/DL (ref 65–99)
HCT VFR BLD AUTO: 36.1 % (ref 37–47)
HDLC SERPL-MCNC: 35 MG/DL
HGB BLD-MCNC: 10.8 G/DL (ref 12–16)
IMM GRANULOCYTES # BLD AUTO: 0.03 K/UL (ref 0–0.11)
IMM GRANULOCYTES NFR BLD AUTO: 0.4 % (ref 0–0.9)
IRON SATN MFR SERPL: 6 % (ref 15–55)
IRON SERPL-MCNC: 24 UG/DL (ref 40–170)
LDLC SERPL CALC-MCNC: 170 MG/DL
LYMPHOCYTES # BLD AUTO: 2.76 K/UL (ref 1–4.8)
LYMPHOCYTES NFR BLD: 34 % (ref 22–41)
MCH RBC QN AUTO: 22.3 PG (ref 27–33)
MCHC RBC AUTO-ENTMCNC: 29.9 G/DL (ref 32.2–35.5)
MCV RBC AUTO: 74.6 FL (ref 81.4–97.8)
MICROCYTES BLD QL SMEAR: ABNORMAL
MONOCYTES # BLD AUTO: 0.52 K/UL (ref 0–0.85)
MONOCYTES NFR BLD AUTO: 6.4 % (ref 0–13.4)
MORPHOLOGY BLD-IMP: NORMAL
NEUTROPHILS # BLD AUTO: 4.11 K/UL (ref 1.82–7.42)
NEUTROPHILS NFR BLD: 50.7 % (ref 44–72)
NRBC # BLD AUTO: 0 K/UL
NRBC BLD-RTO: 0 /100 WBC (ref 0–0.2)
OVALOCYTES BLD QL SMEAR: NORMAL
PLATELET # BLD AUTO: 268 K/UL (ref 164–446)
PLATELET BLD QL SMEAR: NORMAL
PMV BLD AUTO: 12 FL (ref 9–12.9)
POIKILOCYTOSIS BLD QL SMEAR: NORMAL
POTASSIUM SERPL-SCNC: 4.4 MMOL/L (ref 3.6–5.5)
PROT SERPL-MCNC: 7.4 G/DL (ref 6–8.2)
RBC # BLD AUTO: 4.84 M/UL (ref 4.2–5.4)
RBC BLD AUTO: PRESENT
SODIUM SERPL-SCNC: 138 MMOL/L (ref 135–145)
T3FREE SERPL-MCNC: 2.86 PG/ML (ref 2–4.4)
T4 FREE SERPL-MCNC: 1.24 NG/DL (ref 0.93–1.7)
TIBC SERPL-MCNC: 372 UG/DL (ref 250–450)
TRIGL SERPL-MCNC: 138 MG/DL (ref 0–149)
TSH SERPL-ACNC: 2.28 UIU/ML (ref 0.38–5.33)
UIBC SERPL-MCNC: 348 UG/DL (ref 110–370)
WBC # BLD AUTO: 8.1 K/UL (ref 4.8–10.8)

## 2025-07-16 PROCEDURE — 84443 ASSAY THYROID STIM HORMONE: CPT

## 2025-07-16 PROCEDURE — 83550 IRON BINDING TEST: CPT

## 2025-07-16 PROCEDURE — 82306 VITAMIN D 25 HYDROXY: CPT

## 2025-07-16 PROCEDURE — 85025 COMPLETE CBC W/AUTO DIFF WBC: CPT

## 2025-07-16 PROCEDURE — 82728 ASSAY OF FERRITIN: CPT

## 2025-07-16 PROCEDURE — 84481 FREE ASSAY (FT-3): CPT

## 2025-07-16 PROCEDURE — 80061 LIPID PANEL: CPT

## 2025-07-16 PROCEDURE — 83540 ASSAY OF IRON: CPT

## 2025-07-16 PROCEDURE — 84439 ASSAY OF FREE THYROXINE: CPT

## 2025-07-16 PROCEDURE — 80053 COMPREHEN METABOLIC PANEL: CPT

## (undated) DEVICE — SET EXTENSION WITH 2 PORTS (48EA/CA) ***PART #2C8610 IS A SUBSTITUTE*****

## (undated) DEVICE — CLOSURE SKIN STRIP 1/2 X 4 IN - (STERI STRIP) (50/BX 4BX/CA)

## (undated) DEVICE — GLOVE SZ 6.5 BIOGEL PI MICRO - PF LF (50PR/BX)

## (undated) DEVICE — MASK ANESTHESIA ADULT  - (100/CA)

## (undated) DEVICE — CATHETER IV NON-SAFETY 18 GA X 1 1/4 (50/BX 4BX/CA)

## (undated) DEVICE — Device

## (undated) DEVICE — TRAY SPINAL ANESTHESIA NON-SAFETY (10/CA)

## (undated) DEVICE — DRESSING TRANSPARENT FILM TEGADERM 2.375 X 2.75"  (100EA/BX)"

## (undated) DEVICE — SENSOR SPO2 NEO LNCS ADHESIVE (20/BX) SEE USER NOTES

## (undated) DEVICE — LIGASURE SM JAW SEALER CRVD - (6EA/CA)

## (undated) DEVICE — NEPTUNE 4 PORT MANIFOLD - (20/PK)

## (undated) DEVICE — GLOVE BIOGEL SZ 6.5 SURGICAL PF LTX (50PR/BX 4BX/CA)

## (undated) DEVICE — GLOVE BIOGEL INDICATOR SZ 7.5 SURGICAL PF LTX - (50PR/BX 4BX/CA)

## (undated) DEVICE — HEAD HOLDER JUNIOR/ADULT

## (undated) DEVICE — PROTECTOR ULNA NERVE - (36PR/CA)

## (undated) DEVICE — SUTURE 0 VICRYL PLUS CT-1 - 36 INCH (36/BX)

## (undated) DEVICE — NEEDLE INSFL 120MM 14GA VRRS - (20/BX)

## (undated) DEVICE — SUTURE 4-0 VICRYL PLUSFS-1 - 27 INCH (36/BX)

## (undated) DEVICE — RETRACTOR O C SECTION LRY - (5/BX)

## (undated) DEVICE — SEAL 5MM-8MM UNIVERSAL  BOX OF 10

## (undated) DEVICE — SPONGE GAUZESTER. 2X2 4-PL - (2/PK 50PK/BX 30BX/CS)

## (undated) DEVICE — GLOVE BIOGEL SZ 7.5 SURGICAL PF LTX - (50PR/BX 4BX/CA)

## (undated) DEVICE — TUBING CLEARLINK DUO-VENT - C-FLO (48EA/CA)

## (undated) DEVICE — TOWEL STOP TIMEOUT SAFETY FLAG (40EA/CA)

## (undated) DEVICE — SUTURE 3-0 VICRYL PLUS CT-1 - 36 INCH (36/BX)

## (undated) DEVICE — SLEEVE, VASO, THIGH, MED

## (undated) DEVICE — SHEARS MONOPOLAR CURVED  DA VINCI 10X'S REUSABLE

## (undated) DEVICE — CHLORAPREP 26 ML APPLICATOR - ORANGE TINT(25/CA)

## (undated) DEVICE — GLOVE BIOGEL SZ 8.5 SURGICAL PF LTX - (50PR/BX 4BX/CA)

## (undated) DEVICE — SUTURE 2-0 20CM STRATAFIX SPIRAL SH NEEDLE (12/BX)

## (undated) DEVICE — BIPOLAR FORCE DA VINCI 12X'S REISABLE

## (undated) DEVICE — GOWN WARMING STANDARD FLEX - (30/CA)

## (undated) DEVICE — DRAPE COLUMN  BOX OF 20

## (undated) DEVICE — SUCTION INSTRUMENT YANKAUER BULBOUS TIP W/O VENT (50EA/CA)

## (undated) DEVICE — SUTURE 0 36IN PDS + VIO CT-1 (36PK/BX)

## (undated) DEVICE — ELECTRODE 850 FOAM ADHESIVE - HYDROGEL RADIOTRNSPRNT (50/PK)

## (undated) DEVICE — CANISTER SUCTION 3000ML MECHANICAL FILTER AUTO SHUTOFF MEDI-VAC NONSTERILE LF DISP  (40EA/CA)

## (undated) DEVICE — SUTURE 2/0 GUT-PLAIN (36PK/BX)

## (undated) DEVICE — KIT ANESTHESIA W/CIRCUIT & 3/LT BAG W/FILTER (20EA/CA)

## (undated) DEVICE — NEEDLE DRIVER MEGA SUTURECUT DA VINCI 15X'S REUSABLE

## (undated) DEVICE — SYSTEM CLEARIFY VISUALIZATION (10EA/PK)

## (undated) DEVICE — SUTURE 2-0 VICRYL PLUS CT-1 36 (36PK/BX)"

## (undated) DEVICE — DETERGENT RENUZYME PLUS 10 OZ PACKET (50/BX)

## (undated) DEVICE — SET TUBING PNEUMOCLEAR HIGH FLOW SMOKE EVACUATION (10EA/BX)

## (undated) DEVICE — GLOVE BIOGEL PI INDICATOR SZ 6.5 SURGICAL PF LF - (50/BX 4BX/CA)

## (undated) DEVICE — KIT  I.V. START (100EA/CA)

## (undated) DEVICE — ELECTRODE DUAL RETURN W/ CORD - (50/PK)

## (undated) DEVICE — COVER TIP ENDOWRIST HOT SHEAR - (10EA/BX) DA VINCI

## (undated) DEVICE — LACTATED RINGERS INJ 1000 ML - (14EA/CA 60CA/PF)

## (undated) DEVICE — DERMABOND ADVANCED - (12EA/BX)

## (undated) DEVICE — SUTURE 4-0 VICRYL PLUS FS-2 - 27 INCH (36/BX)

## (undated) DEVICE — SUTURE GENERAL

## (undated) DEVICE — SUTURE 0 STRATRFIX SYMMETRIC PDS PLUS 30CM CT-1 (12EA/BX)

## (undated) DEVICE — PACK C-SECTION (2EA/CA)

## (undated) DEVICE — ROBOTIC SURGERY SERVICES

## (undated) DEVICE — OBTURATOR BLADELESS STANDARD 8MM (6EA/BX)

## (undated) DEVICE — WATER IRRIG. STER. 1500 ML - (9/CA)

## (undated) DEVICE — SODIUM CHL IRRIGATION 0.9% 1000ML (12EA/CA)

## (undated) DEVICE — SET LEADWIRE 5 LEAD BEDSIDE DISPOSABLE ECG (1SET OF 5/EA)

## (undated) DEVICE — DRAPE ARM  BOX OF 20